# Patient Record
Sex: FEMALE | NOT HISPANIC OR LATINO | Employment: OTHER | URBAN - METROPOLITAN AREA
[De-identification: names, ages, dates, MRNs, and addresses within clinical notes are randomized per-mention and may not be internally consistent; named-entity substitution may affect disease eponyms.]

---

## 2020-01-17 RX ORDER — HYDROXYCHLOROQUINE SULFATE 200 MG/1
200 TABLET, FILM COATED ORAL 2 TIMES DAILY
COMMUNITY
End: 2024-09-05

## 2020-01-17 RX ORDER — BUPROPION HYDROCHLORIDE 300 MG/1
300 TABLET ORAL DAILY
COMMUNITY

## 2020-01-17 RX ORDER — ALBUTEROL SULFATE 90 UG/1
2 AEROSOL, METERED RESPIRATORY (INHALATION) EVERY 4 HOURS PRN
COMMUNITY

## 2020-01-17 RX ORDER — VITS A,C,E/LUTEIN/MINERALS 300MCG-200
1 TABLET ORAL AT BEDTIME
COMMUNITY
End: 2024-08-18

## 2020-01-17 RX ORDER — CINACALCET 30 MG/1
30 TABLET, FILM COATED ORAL DAILY
COMMUNITY

## 2020-01-17 RX ORDER — SOTALOL HYDROCHLORIDE 80 MG/1
80 TABLET ORAL 2 TIMES DAILY
COMMUNITY
End: 2024-08-18

## 2020-01-17 RX ORDER — ACETAMINOPHEN 500 MG
500-1000 TABLET ORAL 3 TIMES DAILY PRN
COMMUNITY
End: 2024-08-18

## 2020-01-17 RX ORDER — SIMVASTATIN 20 MG
20 TABLET ORAL DAILY
COMMUNITY
End: 2024-08-18

## 2020-01-17 RX ORDER — AMOXICILLIN 500 MG
1200 CAPSULE ORAL 2 TIMES DAILY
COMMUNITY
End: 2024-08-18

## 2020-01-17 RX ORDER — PREDNISONE 5 MG/1
5-25 TABLET ORAL DAILY PRN
COMMUNITY

## 2020-01-17 RX ORDER — LOSARTAN POTASSIUM 50 MG/1
50 TABLET ORAL DAILY
COMMUNITY

## 2020-01-20 ENCOUNTER — APPOINTMENT (OUTPATIENT)
Dept: GENERAL RADIOLOGY | Facility: CLINIC | Age: 75
End: 2020-01-20
Attending: ORTHOPAEDIC SURGERY
Payer: MEDICARE

## 2020-01-20 ENCOUNTER — ANESTHESIA (OUTPATIENT)
Dept: SURGERY | Facility: CLINIC | Age: 75
End: 2020-01-20
Payer: MEDICARE

## 2020-01-20 ENCOUNTER — ANESTHESIA EVENT (OUTPATIENT)
Dept: SURGERY | Facility: CLINIC | Age: 75
End: 2020-01-20
Payer: MEDICARE

## 2020-01-20 ENCOUNTER — HOSPITAL ENCOUNTER (OUTPATIENT)
Facility: CLINIC | Age: 75
Discharge: HOME | End: 2020-01-20
Attending: ORTHOPAEDIC SURGERY | Admitting: ORTHOPAEDIC SURGERY
Payer: MEDICARE

## 2020-01-20 VITALS
WEIGHT: 212.2 LBS | HEIGHT: 66 IN | RESPIRATION RATE: 16 BRPM | BODY MASS INDEX: 34.1 KG/M2 | SYSTOLIC BLOOD PRESSURE: 115 MMHG | HEART RATE: 58 BPM | TEMPERATURE: 97.3 F | DIASTOLIC BLOOD PRESSURE: 70 MMHG | OXYGEN SATURATION: 92 %

## 2020-01-20 DIAGNOSIS — Z98.890 S/P BUNIONECTOMY: Primary | ICD-10-CM

## 2020-01-20 PROCEDURE — 25000128 H RX IP 250 OP 636: Performed by: PHYSICIAN ASSISTANT

## 2020-01-20 PROCEDURE — 25000128 H RX IP 250 OP 636: Performed by: NURSE ANESTHETIST, CERTIFIED REGISTERED

## 2020-01-20 PROCEDURE — 71000012 ZZH RECOVERY PHASE 1 LEVEL 1 FIRST HR: Performed by: ORTHOPAEDIC SURGERY

## 2020-01-20 PROCEDURE — 40000277 XR SURGERY CARM FLUORO LESS THAN 5 MIN W STILLS

## 2020-01-20 PROCEDURE — 25000566 ZZH SEVOFLURANE, EA 15 MIN: Performed by: ORTHOPAEDIC SURGERY

## 2020-01-20 PROCEDURE — 93010 ELECTROCARDIOGRAM REPORT: CPT | Performed by: INTERNAL MEDICINE

## 2020-01-20 PROCEDURE — 37000009 ZZH ANESTHESIA TECHNICAL FEE, EACH ADDTL 15 MIN: Performed by: ORTHOPAEDIC SURGERY

## 2020-01-20 PROCEDURE — 25000125 ZZHC RX 250: Performed by: ORTHOPAEDIC SURGERY

## 2020-01-20 PROCEDURE — 37000008 ZZH ANESTHESIA TECHNICAL FEE, 1ST 30 MIN: Performed by: ORTHOPAEDIC SURGERY

## 2020-01-20 PROCEDURE — 25000128 H RX IP 250 OP 636: Performed by: ANESTHESIOLOGY

## 2020-01-20 PROCEDURE — 36000058 ZZH SURGERY LEVEL 3 EA 15 ADDTL MIN: Performed by: ORTHOPAEDIC SURGERY

## 2020-01-20 PROCEDURE — C1713 ANCHOR/SCREW BN/BN,TIS/BN: HCPCS | Performed by: ORTHOPAEDIC SURGERY

## 2020-01-20 PROCEDURE — 27210794 ZZH OR GENERAL SUPPLY STERILE: Performed by: ORTHOPAEDIC SURGERY

## 2020-01-20 PROCEDURE — 25000125 ZZHC RX 250: Performed by: NURSE ANESTHETIST, CERTIFIED REGISTERED

## 2020-01-20 PROCEDURE — 25800030 ZZH RX IP 258 OP 636: Performed by: NURSE ANESTHETIST, CERTIFIED REGISTERED

## 2020-01-20 PROCEDURE — 25000128 H RX IP 250 OP 636: Performed by: ORTHOPAEDIC SURGERY

## 2020-01-20 PROCEDURE — 40000065 ZZH STATISTIC EKG NON-CHARGEABLE

## 2020-01-20 PROCEDURE — 93005 ELECTROCARDIOGRAM TRACING: CPT

## 2020-01-20 PROCEDURE — 40000170 ZZH STATISTIC PRE-PROCEDURE ASSESSMENT II: Performed by: ORTHOPAEDIC SURGERY

## 2020-01-20 PROCEDURE — 25000132 ZZH RX MED GY IP 250 OP 250 PS 637: Mod: GY | Performed by: PHYSICIAN ASSISTANT

## 2020-01-20 PROCEDURE — 71000027 ZZH RECOVERY PHASE 2 EACH 15 MINS: Performed by: ORTHOPAEDIC SURGERY

## 2020-01-20 PROCEDURE — 36000060 ZZH SURGERY LEVEL 3 W FLUORO 1ST 30 MIN: Performed by: ORTHOPAEDIC SURGERY

## 2020-01-20 PROCEDURE — 71000013 ZZH RECOVERY PHASE 1 LEVEL 1 EA ADDTL HR: Performed by: ORTHOPAEDIC SURGERY

## 2020-01-20 PROCEDURE — 27110028 ZZH OR GENERAL SUPPLY NON-STERILE: Performed by: ORTHOPAEDIC SURGERY

## 2020-01-20 DEVICE — IMP SCR SYN CORTEX 1.5X14MM SELF TAP SS 200.814: Type: IMPLANTABLE DEVICE | Site: FOOT | Status: FUNCTIONAL

## 2020-01-20 DEVICE — IMP PIN ARTHREX BIO TRIM IT 2.0X100MM AR-4152DS: Type: IMPLANTABLE DEVICE | Site: FOOT | Status: FUNCTIONAL

## 2020-01-20 DEVICE — IMP SCR SYN CORTEX 2.0X16MM SELF TAP SS 201.816: Type: IMPLANTABLE DEVICE | Site: FOOT | Status: FUNCTIONAL

## 2020-01-20 RX ORDER — FENTANYL CITRATE 50 UG/ML
25-50 INJECTION, SOLUTION INTRAMUSCULAR; INTRAVENOUS
Status: DISCONTINUED | OUTPATIENT
Start: 2020-01-20 | End: 2020-01-20 | Stop reason: HOSPADM

## 2020-01-20 RX ORDER — HYDROCODONE BITARTRATE AND ACETAMINOPHEN 5; 325 MG/1; MG/1
1-2 TABLET ORAL EVERY 4 HOURS PRN
Qty: 40 TABLET | Refills: 0 | Status: SHIPPED | OUTPATIENT
Start: 2020-01-20 | End: 2024-08-18

## 2020-01-20 RX ORDER — HYDROMORPHONE HYDROCHLORIDE 1 MG/ML
.3-.5 INJECTION, SOLUTION INTRAMUSCULAR; INTRAVENOUS; SUBCUTANEOUS EVERY 10 MIN PRN
Status: DISCONTINUED | OUTPATIENT
Start: 2020-01-20 | End: 2020-01-20 | Stop reason: HOSPADM

## 2020-01-20 RX ORDER — ONDANSETRON 2 MG/ML
4 INJECTION INTRAMUSCULAR; INTRAVENOUS EVERY 30 MIN PRN
Status: DISCONTINUED | OUTPATIENT
Start: 2020-01-20 | End: 2020-01-20 | Stop reason: HOSPADM

## 2020-01-20 RX ORDER — LIDOCAINE HYDROCHLORIDE 20 MG/ML
INJECTION, SOLUTION INFILTRATION; PERINEURAL PRN
Status: DISCONTINUED | OUTPATIENT
Start: 2020-01-20 | End: 2020-01-20

## 2020-01-20 RX ORDER — ONDANSETRON 4 MG/1
4-8 TABLET, ORALLY DISINTEGRATING ORAL EVERY 6 HOURS PRN
Qty: 12 TABLET | Refills: 1 | Status: SHIPPED | OUTPATIENT
Start: 2020-01-20 | End: 2024-08-18

## 2020-01-20 RX ORDER — ONDANSETRON 2 MG/ML
INJECTION INTRAMUSCULAR; INTRAVENOUS PRN
Status: DISCONTINUED | OUTPATIENT
Start: 2020-01-20 | End: 2020-01-20

## 2020-01-20 RX ORDER — HYDROCODONE BITARTRATE AND ACETAMINOPHEN 5; 325 MG/1; MG/1
2 TABLET ORAL
Status: COMPLETED | OUTPATIENT
Start: 2020-01-20 | End: 2020-01-20

## 2020-01-20 RX ORDER — FENTANYL CITRATE 50 UG/ML
INJECTION, SOLUTION INTRAMUSCULAR; INTRAVENOUS PRN
Status: DISCONTINUED | OUTPATIENT
Start: 2020-01-20 | End: 2020-01-20

## 2020-01-20 RX ORDER — HYDROXYZINE HYDROCHLORIDE 10 MG/1
10 TABLET, FILM COATED ORAL
Status: DISCONTINUED | OUTPATIENT
Start: 2020-01-20 | End: 2020-01-20 | Stop reason: HOSPADM

## 2020-01-20 RX ORDER — CEFAZOLIN SODIUM 1 G/3ML
1 INJECTION, POWDER, FOR SOLUTION INTRAMUSCULAR; INTRAVENOUS SEE ADMIN INSTRUCTIONS
Status: DISCONTINUED | OUTPATIENT
Start: 2020-01-20 | End: 2020-01-20 | Stop reason: HOSPADM

## 2020-01-20 RX ORDER — ONDANSETRON 4 MG/1
4 TABLET, ORALLY DISINTEGRATING ORAL EVERY 30 MIN PRN
Status: DISCONTINUED | OUTPATIENT
Start: 2020-01-20 | End: 2020-01-20 | Stop reason: HOSPADM

## 2020-01-20 RX ORDER — DEXAMETHASONE SODIUM PHOSPHATE 4 MG/ML
INJECTION, SOLUTION INTRA-ARTICULAR; INTRALESIONAL; INTRAMUSCULAR; INTRAVENOUS; SOFT TISSUE PRN
Status: DISCONTINUED | OUTPATIENT
Start: 2020-01-20 | End: 2020-01-20

## 2020-01-20 RX ORDER — ROPIVACAINE HYDROCHLORIDE 5 MG/ML
INJECTION, SOLUTION EPIDURAL; INFILTRATION; PERINEURAL
Status: DISCONTINUED
Start: 2020-01-20 | End: 2020-01-20 | Stop reason: HOSPADM

## 2020-01-20 RX ORDER — MAGNESIUM HYDROXIDE 1200 MG/15ML
LIQUID ORAL PRN
Status: DISCONTINUED | OUTPATIENT
Start: 2020-01-20 | End: 2020-01-20 | Stop reason: HOSPADM

## 2020-01-20 RX ORDER — AMOXICILLIN 250 MG
1-2 CAPSULE ORAL 2 TIMES DAILY
Qty: 30 TABLET | Refills: 0 | Status: SHIPPED | OUTPATIENT
Start: 2020-01-20 | End: 2024-08-18

## 2020-01-20 RX ORDER — PROPOFOL 10 MG/ML
INJECTION, EMULSION INTRAVENOUS PRN
Status: DISCONTINUED | OUTPATIENT
Start: 2020-01-20 | End: 2020-01-20

## 2020-01-20 RX ORDER — NALOXONE HYDROCHLORIDE 0.4 MG/ML
.1-.4 INJECTION, SOLUTION INTRAMUSCULAR; INTRAVENOUS; SUBCUTANEOUS
Status: DISCONTINUED | OUTPATIENT
Start: 2020-01-20 | End: 2020-01-20 | Stop reason: HOSPADM

## 2020-01-20 RX ORDER — ROPIVACAINE HYDROCHLORIDE 5 MG/ML
INJECTION, SOLUTION EPIDURAL; INFILTRATION; PERINEURAL PRN
Status: DISCONTINUED | OUTPATIENT
Start: 2020-01-20 | End: 2020-01-20 | Stop reason: HOSPADM

## 2020-01-20 RX ORDER — SODIUM CHLORIDE, SODIUM LACTATE, POTASSIUM CHLORIDE, CALCIUM CHLORIDE 600; 310; 30; 20 MG/100ML; MG/100ML; MG/100ML; MG/100ML
INJECTION, SOLUTION INTRAVENOUS CONTINUOUS
Status: DISCONTINUED | OUTPATIENT
Start: 2020-01-20 | End: 2020-01-20 | Stop reason: HOSPADM

## 2020-01-20 RX ORDER — SODIUM CHLORIDE, SODIUM LACTATE, POTASSIUM CHLORIDE, CALCIUM CHLORIDE 600; 310; 30; 20 MG/100ML; MG/100ML; MG/100ML; MG/100ML
INJECTION, SOLUTION INTRAVENOUS CONTINUOUS PRN
Status: DISCONTINUED | OUTPATIENT
Start: 2020-01-20 | End: 2020-01-20

## 2020-01-20 RX ORDER — ONDANSETRON 4 MG/1
4 TABLET, ORALLY DISINTEGRATING ORAL
Status: DISCONTINUED | OUTPATIENT
Start: 2020-01-20 | End: 2020-01-20 | Stop reason: HOSPADM

## 2020-01-20 RX ORDER — CEFAZOLIN SODIUM 2 G/100ML
2 INJECTION, SOLUTION INTRAVENOUS
Status: COMPLETED | OUTPATIENT
Start: 2020-01-20 | End: 2020-01-20

## 2020-01-20 RX ADMIN — FENTANYL CITRATE 50 MCG: 0.05 INJECTION, SOLUTION INTRAMUSCULAR; INTRAVENOUS at 10:06

## 2020-01-20 RX ADMIN — PROPOFOL 50 MG: 10 INJECTION, EMULSION INTRAVENOUS at 09:10

## 2020-01-20 RX ADMIN — HYDROCODONE BITARTRATE AND ACETAMINOPHEN 1 TABLET: 5; 325 TABLET ORAL at 10:47

## 2020-01-20 RX ADMIN — HYDROMORPHONE HYDROCHLORIDE 0.5 MG: 1 INJECTION, SOLUTION INTRAMUSCULAR; INTRAVENOUS; SUBCUTANEOUS at 09:15

## 2020-01-20 RX ADMIN — DEXAMETHASONE SODIUM PHOSPHATE 4 MG: 4 INJECTION, SOLUTION INTRA-ARTICULAR; INTRALESIONAL; INTRAMUSCULAR; INTRAVENOUS; SOFT TISSUE at 09:15

## 2020-01-20 RX ADMIN — ONDANSETRON 4 MG: 2 INJECTION INTRAMUSCULAR; INTRAVENOUS at 09:15

## 2020-01-20 RX ADMIN — FENTANYL CITRATE 100 MCG: 50 INJECTION, SOLUTION INTRAMUSCULAR; INTRAVENOUS at 09:05

## 2020-01-20 RX ADMIN — FENTANYL CITRATE 50 MCG: 0.05 INJECTION, SOLUTION INTRAMUSCULAR; INTRAVENOUS at 10:35

## 2020-01-20 RX ADMIN — LIDOCAINE HYDROCHLORIDE 60 MG: 20 INJECTION, SOLUTION INFILTRATION; PERINEURAL at 09:05

## 2020-01-20 RX ADMIN — PROPOFOL 150 MG: 10 INJECTION, EMULSION INTRAVENOUS at 09:05

## 2020-01-20 RX ADMIN — CEFAZOLIN SODIUM 2 G: 2 INJECTION, SOLUTION INTRAVENOUS at 09:07

## 2020-01-20 RX ADMIN — SODIUM CHLORIDE, POTASSIUM CHLORIDE, SODIUM LACTATE AND CALCIUM CHLORIDE: 600; 310; 30; 20 INJECTION, SOLUTION INTRAVENOUS at 09:06

## 2020-01-20 ASSESSMENT — COPD QUESTIONNAIRES
CAT_SEVERITY: MODERATE
COPD: 1

## 2020-01-20 ASSESSMENT — MIFFLIN-ST. JEOR: SCORE: 1469.28

## 2020-01-20 ASSESSMENT — LIFESTYLE VARIABLES: TOBACCO_USE: 1

## 2020-01-20 ASSESSMENT — ENCOUNTER SYMPTOMS: DYSRHYTHMIAS: 1

## 2020-01-20 NOTE — OP NOTE
Procedure Date: 01/20/2020      PREOPERATIVE DIAGNOSES:   1.  Severe right-sided bunion deformity.   2.  Fixed clawtoe deformities of the second, third and fourth toes.      POSTOPERATIVE DIAGNOSES:   1.  Severe right-sided bunion deformity.   2.  Fixed clawtoe deformities of the second, third and fourth toes.      PROCEDURES:   1.  BOAT metatarsal osteotomy and bunion repair, right foot, as well as an Akin osteotomy of the proximal phalanx.   2.  Second, third and fourth claw toe repairs with proximal interphalangeal joint excision arthroplasties and intramedullary fixation.   3.  Open Z lengthenings and metatarsophalangeal joint capsulotomies of the second and third toes to correct dorsiflexion and medial deviation contractures.      ANESTHESIA:  General.      SURGEON:  Neris Napoles MD      ASSISTANT:  NAIDA Fisher      PREAMBLE:  Ms. Barriga presented with a large bunion deformity on the right.  She also has fixed clawtoe deformities of the second, third and fourth toes.      Informed consent was obtained for the above-mentioned procedures.      Two grams of Ancef was given prior to surgery.  There is no need for postoperative antibiotics.      DESCRIPTION OF PROCEDURE:  After adequate induction of a general anesthetic, the patient was positioned supine on the operating table.  The right leg was sterilely prepped and free-draped in the usual fashion.  Tourniquet around the thigh was inflated to 300 mmHg.      A 6 cm longitudinal incision was made medial over the right great toe MTP joint.  The capsule was incised longitudinally.  The medial eminence was removed with a saw.  This was followed by a 90-degree metatarsal osteotomy with a long plantar limb.  The capital fragment was translated laterally by 6 mm and immobilized with 2 small screws.      The sesamoids were reduced and the medial capsule repaired.      This was followed by a 20-degree medially-based closing wedge osteotomy of the proximal  phalanx of the great toe.  The osteotomy was secured with a suture.  This gave satisfactory alignment of the first ray.      Attention was moved to the second toe.  A longitudinal incision was made over the second toe PIP joint.  PIP joint excision arthroplasty was done to correct the fixed deformity.  The toe was immobilized with an intramedullary Trim-It pin.      There was still an extension contracture at the MTP joint, and a separate incision was made over the MTP joint to do Z lengthening of the extensor tendon, as well as a dorsal and medial MTP joint capsulotomy.      Attention was moved to the third toe.  A transverse incision was made over the PIP joint, and a PIP joint excision arthroplasty was done.  The toe was then immobilized with an intramedullary pin.      A similar dorsal incision was made over the MTP joint to do an extensor tendon lengthening, as well as a dorsal and medial capsulotomy of the MTP joint.      Attention was moved to the fourth toe.  The deformity was mainly in the DIP joint, and a DIP joint excision arthroplasty was done followed by a Trim-It fixation.      This gave satisfactory alignment of her foot.  It was not necessary to do anything to the little toe.      The tourniquet was deflated.  Hemostasis obtained.  The wounds closed in layers.  A sterile dressing and a light compressive bandage applied.  She tolerated the operative procedure well and was taken to the recovery room in satisfactory condition after application of a bunion shoe.      She can ambulate weightbearing as tolerated.  Sutures will be removed in 2 weeks.         BERTO AMAYA MD             D: 2020   T: 2020   MT: MASON      Name:     CHUCK OREILLY   MRN:      [id number]        Account:        [id number]   :      1945           Procedure Date: 2020      Document: [id number]

## 2020-01-20 NOTE — ANESTHESIA PREPROCEDURE EVALUATION
Anesthesia Pre-Procedure Evaluation    Patient: Heydi Barriga   MRN: [id number] : 1945          Preoperative Diagnosis: Bunion [M21.619]  Clawtoe, acquired, left [M20.5X2]    Procedure(s):  LEFT SECOND AND THIRD HAMMERTOE CORRECTIONS, RIGHT BUION AND SECOND CLAW TOE  CORRECTIONS ((MINI FRAG AND ARTHREX TRIM IT PINS)    Past Medical History:   Diagnosis Date     Acute appendicitis      Bradycardia      Chest pain      Claustrophobia      COPD (chronic obstructive pulmonary disease) (H)      Coronary artery disease     coronary Atherosclerosis     Depression      Depressive disorder      Disorder of sacrum      JASON (generalized anxiety disorder)      Gastro-oesophageal reflux disease      Hyperlipidemia      Hyperparathyroidism (H)      Hypertension      Myofascial pain      Obesity      Osteoarthritis     Knees and feet     PAF (paroxysmal atrial fibrillation) (H)      Polyarthralgia      Rheumatoid arthritis (H)      Right knee skin infection     2012     Rotator cuff dysfunction, left      Sleep apnea     has CPAP, but does not use, Claustrophobic     Synovial cyst popliteal space     Right     Ulnar neuropathy      Urinary incontinence      Vitamin D deficiency      Past Surgical History:   Procedure Laterality Date     ARTHROPLASTY KNEE      Right     ARTHROPLASTY MINIMALLY INVASIVE KNEE  2012    Procedure: ARTHROPLASTY MINIMALLY INVASIVE KNEE;  Left Total Knee Arthroplasty Minimally Invasive;  Surgeon: Daron Ro MD;  Location: [facility] OR     BLEPHAROPLASTY, RHYTIDECTOMY, COMBINED       BREAST BIOPSY, CORE RT/LT      benign     C EVACUATION OF HEMATOMA      right and left neck     COLONOSCOPY       DILATION AND CURETTAGE       INCISION AND DRAINAGE KNEE, COMBINED  12/10/2012    Procedure: COMBINED INCISION AND DRAINAGE KNEE;;  Surgeon: Daron Ro MD;  Location: [facility]     IRRIGATION AND DEBRIDEMENT KNEE, COMBINED  2012    Procedure: COMBINED IRRIGATION AND DEBRIDEMENT KNEE;   Right Knee Irrigation & Debridement.;  Surgeon: Daron Ro MD;  Location: [facility] OR       Anesthesia Evaluation     . Pt has had prior anesthetic.     No history of anesthetic complications          ROS/MED HX    ENT/Pulmonary: Comment: DEMETRI in past, no CPAP, believes this has resolved following weight loss    (+)tobacco use, Past use moderate COPD, , . .   (-) recent URI   Neurologic:  - neg neurologic ROS     Cardiovascular:     (+) hypertension--CAD, --. Taking blood thinners : . . . :. dysrhythmias a-fib, .      (-) angina, past MI, CHF, pacemaker, valvular problems/murmurs, stent, pacemaker, angina, past MI and CABG   METS/Exercise Tolerance:  >4 METS   Hematologic:  - neg hematologic  ROS       Musculoskeletal: Comment: RA  OA s/p TKA  Low back pain        GI/Hepatic:     (+) GERD Asymptomatic on medication,       Renal/Genitourinary:      (-) renal disease   Endo:      (-) Type II DM   Psychiatric:  - neg psychiatric ROS       Infectious Disease:         Malignancy:         Other:                          Physical Exam  Normal systems: dental    Airway   Mallampati: II  TM distance: >3 FB  Neck ROM: full    Dental     Cardiovascular   Rhythm and rate: regular and normal      Pulmonary    breath sounds clear to auscultation            Lab Results   Component Value Date    WBC 7.3 12/06/2012    HGB 8.9 (L) 12/12/2012    HCT 26.3 (L) 12/06/2012     12/10/2012    .1 (H) 12/06/2012     (H) 12/06/2012     12/12/2012    POTASSIUM 4.0 12/12/2012    CHLORIDE 99 12/12/2012    CO2 27 12/12/2012    BUN 10 12/12/2012    CR 0.60 12/12/2012     (H) 12/12/2012    TIGIST 9.1 12/12/2012    MAG 2.0 07/17/2012    ALBUMIN 3.9 07/10/2012    PROTTOTAL 7.0 07/10/2012    ALT 13 08/17/2012    AST 26 08/17/2012    ALKPHOS 87 08/17/2012    BILITOTAL 0.3 08/17/2012    PTT 43 (H) 12/10/2012    INR 1.16 (H) 12/10/2012       Preop Vitals  BP Readings from Last 3 Encounters:   01/20/20 130/63   12/14/12  "127/50   12/12/12 121/67    Pulse Readings from Last 3 Encounters:   01/20/20 70   12/14/12 78   12/12/12 65      Resp Readings from Last 3 Encounters:   01/20/20 16   12/14/12 18   12/12/12 16    SpO2 Readings from Last 3 Encounters:   01/20/20 97%   12/14/12 97%   12/12/12 96%      Temp Readings from Last 1 Encounters:   01/20/20 36.3  C (97.3  F) (Oral)    Ht Readings from Last 1 Encounters:   01/20/20 1.676 m (5' 6\")      Wt Readings from Last 1 Encounters:   01/20/20 96.3 kg (212 lb 3.2 oz)    Estimated body mass index is 34.25 kg/m  as calculated from the following:    Height as of this encounter: 1.676 m (5' 6\").    Weight as of this encounter: 96.3 kg (212 lb 3.2 oz).       Anesthesia Plan      History & Physical Review  History and physical reviewed and following examination; no interval change.    ASA Status:  3 .    NPO Status:  > 8 hours    Plan for General and LMA with Intravenous and Propofol induction. Maintenance will be Balanced.    PONV prophylaxis:  Ondansetron (or other 5HT-3) and Dexamethasone or Solumedrol       Postoperative Care  Postoperative pain management:  IV analgesics and Oral pain medications.      Consents  Anesthetic plan, risks, benefits and alternatives discussed with:  Patient..                 Neymar Yanes MD  "

## 2020-01-20 NOTE — BRIEF OP NOTE
[facility]    Brief Operative Note    Pre-operative diagnosis: Bunion [M21.619]  Clawtoe, acquired, left [M20.5X2]  Post-operative diagnosis bunion hammertoes    Procedure: Procedure(s):  RIGHT BUNION AND CLAW TOE REPAIR TWO THROUGH FIVE  Surgeon: Surgeon(s) and Role:     * Neris Napoles MD - Primary     * Truman Velez PA-C - Assisting  Anesthesia: General   Estimated blood loss: Less than 50 ml  Drains: None  Specimens: * No specimens in log *  Findings:   expected.  Complications: None.  Implants:   Implant Name Type Inv. Item Serial No.  Lot No. LRB No. Used   IMP PIN ARTHREX BIO TRIM IT [id number] AR-[id number] Metallic Hardware/[employer] IMP PIN ARTHREX BIO TRIM IT [id number] AR-[id number]  ARTHREX [id number] Right 2   IMP SCR SYN CORTEX 2.0X16MM SELF TAP .816 Metallic Hardware/[employer]ebring IMP SCR SYN CORTEX 2.0X16MM SELF TAP .816  SYNTHES-STRATEC 364   [id number]  [id number] 2019 Right 1   IMP SCR SYN CORTEX 1.5X14MM SELF TAP .814 Metallic Hardware/[employer]ebring IMP SCR SYN CORTEX 1.5X14MM SELF TAP .814  SYNTHES-STRATEC 364   [id number]  310DEC 2019 Right 1

## 2020-01-20 NOTE — ANESTHESIA POSTPROCEDURE EVALUATION
Patient: Heydi Barriga    Procedure(s):  RIGHT BUNION AND CLAW TOE REPAIR TWO THROUGH FIVE    Diagnosis:Bunion [M21.619]  Clawtoe, acquired, left [M20.5X2]  Diagnosis Additional Information: No value filed.    Anesthesia Type:  General, LMA    Note:  Anesthesia Post Evaluation    Patient location during evaluation: PACU  Patient participation: Able to fully participate in evaluation  Level of consciousness: awake  Pain management: adequate  Airway patency: patent  Cardiovascular status: acceptable  Respiratory status: acceptable  Hydration status: acceptable  PONV: none     Anesthetic complications: None          Last vitals:  Vitals:    01/20/20 1045 01/20/20 1100 01/20/20 1222   BP: (!) 150/72 (!) 148/78 115/70   Pulse: 76 72 58   Resp: 16 16 16   Temp:      SpO2: 96% 95% 92%         Electronically Signed By: Neymar Yanes MD  January 20, 2020  1:19 PM

## 2020-01-20 NOTE — ANESTHESIA CARE TRANSFER NOTE
Patient: Heydi Barriga    Procedure(s):  RIGHT BUNION AND CLAW TOE REPAIR TWO THROUGH FIVE    Diagnosis: Bunion [M21.619]  Clawtoe, acquired, left [M20.5X2]  Diagnosis Additional Information: No value filed.    Anesthesia Type:   General, LMA     Note:  Airway :Face Mask  Patient transferred to:PACU  Comments: A&O x 3.  Denies pain, N&V.  Report to RN.      Vitals: (Last set prior to Anesthesia Care Transfer)    CRNA VITALS  1/20/2020 0928 - 1/20/2020 1003      1/20/2020             Pulse:  70    SpO2:  97 %    Resp Rate (set):  10                Electronically Signed By: MYKE Weeks CRNA  January 20, 2020  10:03 AM

## 2020-01-20 NOTE — PROGRESS NOTES
Medication History Completed by Medication Scribe  Admission medication history interview status for the 1/20/2020  admission is complete. See EPIC admission navigator for prior to admission medications     Medication history sources: Patient, Surescripts, H&P and Patient's home med list  Medication history source reliability: Good  Adherence assessment: Good    Significant changes made to the medication list:  Patient taking meds differently than prescribed; See PTA entries for: prednisone      Additional medication history information:   None    Medication reconciliation completed by provider prior to medication history? No    Time spent in this activity: 30 minutes      Prior to Admission medications    Medication Sig Last Dose Taking? Auth Provider   acetaminophen (TYLENOL) 500 MG tablet Take 500-1,000 mg by mouth 3 times daily as needed for mild pain 500 mg on 1/20/2020 at 0430 Yes Reported, Patient   aclidinium (TUDORZA PRESSAIR) 400 MCG/ACT inhaler Inhale 1-2 puffs into the lungs 2 times daily 1 puff on 1/20/2020 at 0430 Yes Reported, Patient   albuterol (PROAIR HFA/PROVENTIL HFA/VENTOLIN HFA) 108 (90 Base) MCG/ACT inhaler Inhale 1-2 puffs into the lungs every 4 hours as needed for shortness of breath / dyspnea or wheezing 1/15/2020 at prn Yes Reported, Patient   Ascorbic Acid 1000 MG TABS Take 1,000 mg by mouth 2 times daily  1/19/2020 at 1200 Yes Reported, Patient   B Complex Vitamins (VITAMIN-B COMPLEX PO) Take 1 tablet by mouth daily 1/19/2020 at 1200 Yes Reported, Patient   buPROPion (WELLBUTRIN XL) 300 MG 24 hr tablet Take 300 mg by mouth daily (at 12:00) 1/19/2020 at 1200 Yes Reported, Patient   Cholecalciferol (VITAMIN D) 2000 UNITS tablet Take 4,000 Units by mouth At Bedtime  1/19/2020 at 2330 Yes Reported, Patient   cinacalcet (SENSIPAR) 30 MG tablet Take 30 mg by mouth daily (at 12:00) 1/19/2020 at 1200 Yes Reported, Patient   escitalopram (LEXAPRO) 20 MG tablet Take 40 mg by mouth daily (Take 2 X  20 mg = 40 mg dose) 1/19/2020 at 1200 Yes Reported, Patient   hydroxychloroquine (PLAQUENIL) 200 MG tablet Take 200 mg by mouth 2 times daily 1/19/2020 at 2330 Yes Reported, Patient   losartan (COZAAR) 50 MG tablet Take 50 mg by mouth daily (at 12:00) 1/19/2020 at 1200 Yes Reported, Patient   Multiple Vitamins-Minerals (MULTIVITAMIN OR) Take 1 tablet by mouth daily (at 12:00) 1/19/2020 at 1200 Yes Reported, Patient   multivitamin (OCUVITE) TABS tablet Take 1 tablet by mouth At Bedtime  1/19/2020 at 2330 Yes Reported, Patient   Omega-3 Fatty Acids (FISH OIL) 1200 MG capsule Take 1,200 mg by mouth 2 times daily Over 2 months ago at am Yes Reported, Patient   omeprazole (PRILOSEC) 20 MG DR capsule Take 20 mg by mouth 2 times daily  1/19/2020 at 2330 Yes Reported, Patient   PRASTERONE, DHEA, PO Take 5 mg by mouth daily  1/19/2020 at 1200 Yes Reported, Patient   predniSONE (DELTASONE) 5 MG tablet Take 5-25 mg by mouth daily as needed (arthritis)  25 mg on 1/19/2020 at 1200 Yes Reported, Patient   Probiotic Product (PROBIOTIC-10 PO) Take 1 capsule by mouth daily (at 12:00) 1/19/2020 at 1200 Yes Reported, Patient   pyridOXINE (VITAMIN B6) 100 MG TABS Take 100 mg by mouth daily (at 12:00) 1/19/2020 at 1200 Yes Reported, Patient   rivaroxaban ANTICOAGULANT (XARELTO) 20 MG TABS tablet Take 20 mg by mouth daily (with dinner) 1/16/2020 at pm Yes Reported, Patient   simvastatin (ZOCOR) 20 MG tablet Take 20 mg by mouth daily (at 12:00) 1/19/2020 at 1200 Yes Reported, Patient   sotalol (BETAPACE) 80 MG tablet Take 80 mg by mouth 2 times daily 1/19/2020 at 2330 Yes Reported, Patient   tofacitinib (XELJANZ) 5 MG tablet Take 5 mg by mouth 2 times daily 1/19/2020 at 2330 Yes Reported, Patient   tretinoin (RETIN-A) 0.05 % cream Apply  topically nightly as needed. Over 2 months ago at prn Yes Reported, Patient

## 2020-01-20 NOTE — DISCHARGE INSTRUCTIONS
**If you have questions or concerns about your procedure,   call Dr. Napoles at [phone]**        Same Day Surgery Discharge Instructions for  Sedation and General Anesthesia       It's not unusual to feel dizzy, light-headed or faint for up to 24 hours after surgery or while taking pain medication.  If you have these symptoms: sit for a few minutes before standing and have someone assist you when you get up to walk or use the bathroom.      You should rest and relax for the next 24 hours. We recommend you make arrangements to have an adult stay with you for at least 24 hours after your discharge.  Avoid hazardous and strenuous activity.      DO NOT DRIVE any vehicle or operate mechanical equipment for 24 hours following the end of your surgery.  Even though you may feel normal, your reactions may be affected by the medication you have received.      Do not drink alcoholic beverages for 24 hours following surgery.       Slowly progress to your regular diet as you feel able. It's not unusual to feel nauseated and/or vomit after receiving anesthesia.  If you develop these symptoms, drink clear liquids (apple juice, ginger ale, broth, 7-up, etc. ) until you feel better.  If your nausea and vomiting persists for 24 hours, please notify your surgeon.        All narcotic pain medications, along with inactivity and anesthesia, can cause constipation. Drinking plenty of liquids and increasing fiber intake will help.      For any questions of a medical nature, call your surgeon.      Do not make important decisions for 24 hours.      If you had general anesthesia, you may have a sore throat for a couple of days related to the breathing tube used during surgery.  You may use Cepacol lozenges to help with this discomfort.  If it worsens or if you develop a fever, contact your surgeon.       If you feel your pain is not well managed with the pain medications prescribed by your surgeon, please contact your surgeon's office  to let them know so they can address your concerns.   POST-OPERATIVE INSTRUCTIONS  FOOT AND ANKLE SURGERY  RAYNA Napoles M.D.  Toribio Velez P.A.-C    These precautions MUST be followed for the first 24 hours after surgery:    Upon discharge, go directly home.    You must make arrangements to have a responsible adult stay with you.    DO NOT DRINK ALCOHOLIC BEVERAGES    It is not unusual to feel lightheaded up to 24 hours after surgery or while taking pain medication.  If you feel lightheaded, sit for a few minutes before standing and have someone assisted you when you get up to walk or use the restroom.    Do not use any mechanical equipment.    Do not make any important or legal decisions for 24 hours or while on pain medications.    You may experience dry mouth, sore throat, or sleep disturbances from the anesthesia and medications used during surgery.  Generalized muscle aches can sometimes occur.  These usually disappear in 12-24 hours.    POST-OPERATIVE CARE GUIDELINES    The following are general guidelines about what to expect the first days/weeks after surgery.  They are not specific, and your recovery may be slightly different.    Blood clots are not common, but are emergencies.  If you have sudden onset pain in your calf or leg, or have sudden shortness of breath, go to the Emergency Department.      Elevation of your operative foot/ankle is key to reducing the swelling in the immediate post-operative phase (first 3-5 days).  When you are at rest, elevate your foot at or above the level of your heart.  When sitting, your foot should be elevated on a chair or stool; not hanging down.      You should plan to rest the day after surgery no matter how minor the procedure.  More complicated procedures will require more time to return to normal activity.      Foot and ankle surgery is painful in most cases.   It is not unusual for the pain to be worse a day or two after surgery than on the day of.  If your  pain is more than 8/10 contact our office.  If you don t have pain, gradually decrease your pain meds by substituting Tylenol.  Please don t use Advil/ibuprofen unless we order it for you.  Pt may resume regular home medications of:  ALL PRESCRIBED BLOOD THINNERS (INCLUDING ASA 81MG) on the day after surgery.        You will be prescribed Percocet or Vicodin for pain, Vistaril for spasms/pain adjunct, Senokot for constipation.  If you have had trouble taking these meds before or experience nausea or vomiting after surgery from your medication, please advise the recovery room nurses.  If you are already at home, try drinking only clear liquids and/or call our office.  Start taking narcotic pain medication when you feel sensation in your lower extremity after a block.       All pain medications, along with inactivity can cause constipation.  Use the Senakot as directed, increase fluid intake to 1 quart per day and increase your dietary fiber.  (The  P  fruits - peaches, plums, pears, and prunes as well as anise/black licorice are recommended.)    It is not unusual to run a low-grade fever after surgery.  If your temperature is elevated above 102 , lasts longer than 24 hours, or is questionable in any way, contact our office.      Drainage from your cast/dressing is normal.  Reinforce your cast/dressing with 4x4 dressings and cover with an Ace wrap.  Wait until 24 hours after surgery to change your dressings; by this time most of your bleeding will have stopped.  If you have drainage through your dressings 2 days after surgery, contact our office.      You cast/dressing will be changed at your 2-week follow up appointment.  They should be kept clean and DRY.  If your cast/dressing is damaged before that, contact our office.      It is normal to experience some bruising in the toes after surgery and they may appear  dark  when your foot hangs down.  It is important to actively wiggle your toes for at least 5-10 minutes  each hour UNLESS you had surgery on those toes.      Use ice on your foot/ankle over the dressing/cast for 20 minutes per hour, 10 or more times per day.  A large bag of frozen peas works well.      Bathing: take a tub or sponge bath instead of a shower if possible during the first two weeks.  If you choose to shower, cover the dressing/cast with a waterproof covering, these may be ordered from www.seal-tight.com or are available at some pharmacies/medical supply stores.  Another option is XeroSox, which is the original vacuum sealed bandage and cast cover.  The cast cover has a vacuum seal and is absolutely waterproof.  2-564-838-2985 or [email or web address].[email or web address] for more information.      Driving:  For surgery on the left foot/ankle, you may drive as soon as narcotic medications are stopped.  For surgery on the right foot/ankle, you may drive when you are out of a cast, off pain medications, and you feel secure with braking.      In general, listen to your foot/ankle.  If you have a sudden, dramatic increase in pain that does not resolve after an hour or so, something is wrong - call our office.  If you fall or bump your foot/ankle and have sudden pain that resolves, give it 24 hours before you call.      Many of your questions can be addressed at your 2-week follow-up appointment - please make a list of things to ask us as they come up during your recovery.      If you had a nerve block it is common to have numbness in your leg and foot for up to 30 hours after surgery.  If your leg or foot is still numb more than 30 hours after surgery, please call the office.      FUTURE DENTIST VISITS:  If you have had a total ankle arthroplasty please be advised you must be on antibiotics prior to ANY dental work.  Please call your dentist office ahead of time and make them aware of this as your dentist will be able to order the prescription.  If you have had any other surgery this is not a concern.    If you have any further questions  or concerns, please call our office at [phone]      Pt may resume regular home medications of all blood thinners including aspirin on the day after surgery.

## 2020-01-24 LAB — INTERPRETATION ECG - MUSE: NORMAL

## 2021-05-26 ENCOUNTER — RECORDS - HEALTHEAST (OUTPATIENT)
Dept: ADMINISTRATIVE | Facility: CLINIC | Age: 76
End: 2021-05-26

## 2021-05-28 ENCOUNTER — RECORDS - HEALTHEAST (OUTPATIENT)
Dept: ADMINISTRATIVE | Facility: CLINIC | Age: 76
End: 2021-05-28

## 2021-05-29 ENCOUNTER — RECORDS - HEALTHEAST (OUTPATIENT)
Dept: ADMINISTRATIVE | Facility: CLINIC | Age: 76
End: 2021-05-29

## 2021-05-30 ENCOUNTER — RECORDS - HEALTHEAST (OUTPATIENT)
Dept: ADMINISTRATIVE | Facility: CLINIC | Age: 76
End: 2021-05-30

## 2022-09-16 ENCOUNTER — TRANSCRIBE ORDERS (OUTPATIENT)
Dept: OTHER | Age: 77
End: 2022-09-16

## 2022-09-16 DIAGNOSIS — K21.9 GASTROESOPHAGEAL REFLUX DISEASE, UNSPECIFIED WHETHER ESOPHAGITIS PRESENT: Primary | ICD-10-CM

## 2024-04-24 ENCOUNTER — DOCUMENTATION ONLY (OUTPATIENT)
Dept: OTHER | Facility: CLINIC | Age: 79
End: 2024-04-24
Payer: MEDICARE

## 2024-06-19 ENCOUNTER — ANCILLARY PROCEDURE (OUTPATIENT)
Dept: PET IMAGING | Facility: CLINIC | Age: 79
End: 2024-06-19
Attending: PSYCHIATRY & NEUROLOGY
Payer: MEDICARE

## 2024-06-19 DIAGNOSIS — G30.9 ALZHEIMERS DISEASE (H): ICD-10-CM

## 2024-06-19 DIAGNOSIS — F02.80 ALZHEIMERS DISEASE (H): ICD-10-CM

## 2024-07-02 ENCOUNTER — DOCUMENTATION ONLY (OUTPATIENT)
Dept: OTHER | Facility: CLINIC | Age: 79
End: 2024-07-02
Payer: MEDICARE

## 2024-07-02 ENCOUNTER — DOCUMENTATION ONLY (OUTPATIENT)
Dept: GERIATRICS | Facility: CLINIC | Age: 79
End: 2024-07-02
Payer: MEDICARE

## 2024-07-08 ENCOUNTER — DOCUMENTATION ONLY (OUTPATIENT)
Dept: GERIATRICS | Facility: CLINIC | Age: 79
End: 2024-07-08
Payer: MEDICARE

## 2024-07-08 DIAGNOSIS — Z53.9 ERRONEOUS ENCOUNTER--DISREGARD: Primary | ICD-10-CM

## 2024-07-09 ENCOUNTER — ASSISTED LIVING VISIT (OUTPATIENT)
Dept: GERIATRICS | Facility: CLINIC | Age: 79
End: 2024-07-09
Payer: MEDICARE

## 2024-07-09 DIAGNOSIS — Z71.89 ADVANCED DIRECTIVES, COUNSELING/DISCUSSION: ICD-10-CM

## 2024-07-09 DIAGNOSIS — F03.A0 MILD DEMENTIA WITHOUT BEHAVIORAL DISTURBANCE, PSYCHOTIC DISTURBANCE, MOOD DISTURBANCE, OR ANXIETY, UNSPECIFIED DEMENTIA TYPE (H): Primary | ICD-10-CM

## 2024-07-09 DIAGNOSIS — M81.0 AGE-RELATED OSTEOPOROSIS WITHOUT CURRENT PATHOLOGICAL FRACTURE: ICD-10-CM

## 2024-07-09 DIAGNOSIS — F33.0 MAJOR DEPRESSIVE DISORDER, RECURRENT EPISODE, MILD (H): ICD-10-CM

## 2024-07-09 DIAGNOSIS — E21.0 PRIMARY HYPERPARATHYROIDISM (H): ICD-10-CM

## 2024-07-09 DIAGNOSIS — K21.9 GASTROESOPHAGEAL REFLUX DISEASE, UNSPECIFIED WHETHER ESOPHAGITIS PRESENT: ICD-10-CM

## 2024-07-09 DIAGNOSIS — I10 PRIMARY HYPERTENSION: ICD-10-CM

## 2024-07-09 DIAGNOSIS — I48.20 CHRONIC A-FIB (H): ICD-10-CM

## 2024-07-09 DIAGNOSIS — I25.10 CORONARY ARTERY DISEASE INVOLVING NATIVE CORONARY ARTERY OF NATIVE HEART WITHOUT ANGINA PECTORIS: ICD-10-CM

## 2024-07-09 DIAGNOSIS — J44.9 CHRONIC OBSTRUCTIVE PULMONARY DISEASE WITHOUT EXACERBATION (H): ICD-10-CM

## 2024-07-09 DIAGNOSIS — M06.9 RHEUMATOID ARTHRITIS INVOLVING MULTIPLE SITES, UNSPECIFIED WHETHER RHEUMATOID FACTOR PRESENT (H): ICD-10-CM

## 2024-07-09 DIAGNOSIS — R32 URINARY INCONTINENCE, UNSPECIFIED TYPE: ICD-10-CM

## 2024-07-09 PROCEDURE — 99345 HOME/RES VST NEW HIGH MDM 75: CPT | Performed by: NURSE PRACTITIONER

## 2024-07-09 RX ORDER — VIBEGRON 75 MG/1
75 TABLET, FILM COATED ORAL DAILY
COMMUNITY

## 2024-07-09 RX ORDER — ATOMOXETINE 40 MG/1
40 CAPSULE ORAL DAILY
COMMUNITY

## 2024-07-09 RX ORDER — ROSUVASTATIN CALCIUM 10 MG/1
10 TABLET, COATED ORAL DAILY
COMMUNITY

## 2024-07-09 RX ORDER — BUDESONIDE AND FORMOTEROL FUMARATE DIHYDRATE 80; 4.5 UG/1; UG/1
2 AEROSOL RESPIRATORY (INHALATION) 2 TIMES DAILY
COMMUNITY

## 2024-07-09 RX ORDER — MECLIZINE HCL 12.5 MG 12.5 MG/1
12.5 TABLET ORAL 2 TIMES DAILY PRN
COMMUNITY

## 2024-07-09 RX ORDER — IBUPROFEN 200 MG
1 CAPSULE ORAL 2 TIMES DAILY
COMMUNITY

## 2024-07-09 RX ORDER — ESTRADIOL 0.1 MG/G
CREAM VAGINAL
COMMUNITY

## 2024-07-09 RX ORDER — FAMOTIDINE 20 MG/1
20 TABLET, FILM COATED ORAL 2 TIMES DAILY
COMMUNITY

## 2024-07-09 RX ORDER — METRONIDAZOLE 7.5 MG/G
GEL TOPICAL 2 TIMES DAILY
COMMUNITY

## 2024-07-09 RX ORDER — PREDNISONE 5 MG/1
5 TABLET ORAL DAILY
COMMUNITY
Start: 2024-07-11 | End: 2024-07-13

## 2024-07-09 RX ORDER — DESVENLAFAXINE 100 MG/1
100 TABLET, EXTENDED RELEASE ORAL DAILY
COMMUNITY

## 2024-07-09 RX ORDER — PREDNISONE 5 MG/1
10 TABLET ORAL DAILY
COMMUNITY
Start: 2024-07-08 | End: 2024-07-10

## 2024-07-09 NOTE — PROGRESS NOTES
"[facility] GERIATRICS    PRIMARY CARE PROVIDER AND CLINIC:  MYKE Colón CNP, [address] [facility] [address]  Chief Complaint   Patient presents with    [facility] Medical Record Number:  [id number]  Place of Service where encounter took place:  THE [facility]) [[id number]]    Heydi Barriga  is a 79 year old  (1945), living in above facility since 12/7/23 and now choosing to change PCPs to [facility].     HPI:    Complex medical history significant for afib with failed cardioversions, CAD, HTN, rheumatoid arthritis, COPD, DEMETRI on BiPAP, osteoporosis, hyperparathyroidism, depression, anxiety. Recently diagnosed with dementia and work up is in process.     She is a fairly good historian. Pleasant and talkative, conversation somewhat tangential. Currently on prednisone taper for flare of RA pain with improvement. Denies feeling ill. Good appetite. States that she's had a gradual weight loss over the past 3 yrs. Weight is down 14 lbs since 2/2024. Reports occasional reflux symptoms, but no significant GI issues. Voice has been \"gravelly\" for years and is unchanged. She initially noted cognitive decline several years ago. Had difficulty processing information and completing work related tasks. Understandably, she is  anxious about a diagnosis of dementia. Feels that she is managing well in the facility, although  misses her house.Ambulates with a walker and independent with cares. Meds are administered by staff, \" I was cavalier about taking them.\"     CODE STATUS/ADVANCE DIRECTIVES DISCUSSION:  No CPR- Do NOT Intubate    ALLERGIES:   Allergies   Allergen Reactions    Pregabalin Other (See Comments)     Post nasal drip    Atorvastatin Cough     Post nasal drip    Gabapentin Other (See Comments)     Felt dissasociated    Lisinopril     Oxycodone Other (See Comments)     Agitated, and crying    Ramipril     Walnuts [Nuts] Other (See Comments)     Scratchy  " throat      PAST MEDICAL HISTORY:   Past Medical History:   Diagnosis Date    Acute appendicitis     Bradycardia     Chest pain     Claustrophobia     COPD (chronic obstructive pulmonary disease) (H)     Coronary artery disease     coronary Atherosclerosis    Depression     Depressive disorder     Disorder of sacrum     JASON (generalized anxiety disorder)     Gastro-oesophageal reflux disease     Hyperlipidemia     Hyperparathyroidism (H24)     Hypertension     Myofascial pain     Obesity     Osteoarthritis     Knees and feet    PAF (paroxysmal atrial fibrillation) (H)     Polyarthralgia     Rheumatoid arthritis (H)     Right knee skin infection     7/2012    Rotator cuff dysfunction, left     Sleep apnea     has CPAP, but does not use, Claustrophobic    Synovial cyst popliteal space     Right    Ulnar neuropathy     Urinary incontinence     Vitamin D deficiency       PAST SURGICAL HISTORY:   has a past surgical history that includes Irrigation and debridement knee, combined (7/11/2012); Arthroplasty knee; colonoscopy; Dilation and curettage; Blepharoplasty, rhytidectomy, combined; zzc evacuation of hematoma; breast biopsy, core rt/lt; Arthroplasty minimally invasive knee (11/28/2012); Incision and drainage knee, combined (12/10/2012); and Bunionectomy arpit, repair hammer toe(s), combined (Right, 1/20/2020).  FAMILY HISTORY: family history is not on file.  SOCIAL HISTORY:   reports that she has quit smoking. She has never used smokeless tobacco. She reports current alcohol use. She reports that she does not use drugs.  Patient's living condition: lives in an assisted living facility   9/2015 and was living alone in her home. No children. 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       Current Outpatient Medications   Medication Sig Dispense Refill    albuterol (PROAIR HFA/PROVENTIL HFA/VENTOLIN HFA) 108 (90 Base) MCG/ACT inhaler Inhale 2 puffs into the lungs every 4  hours as needed for shortness of breath or wheezing      atomoxetine (STRATTERA) 40 MG capsule Take 40 mg by mouth daily      baricitinib (OLUMIANT) 2 MG tablet Take 2 mg by mouth daily      budesonide-formoterol (SYMBICORT) 80-4.5 MCG/ACT Inhaler Inhale 2 puffs into the lungs 2 times daily      buPROPion (WELLBUTRIN XL) 300 MG 24 hr tablet Take 300 mg by mouth daily (at 12:00)      calcium citrate (CITRACAL) 950 (200 Ca) MG tablet Take 1 tablet by mouth 2 times daily      Cholecalciferol (VITAMIN D) 2000 UNITS tablet Take 50 mcg by mouth daily      cinacalcet (SENSIPAR) 30 MG tablet Take 30 mg by mouth daily (at 12:00)      desvenlafaxine (PRISTIQ) 100 MG 24 hr tablet Take 100 mg by mouth daily      estradiol (ESTRACE) 0.1 MG/GM vaginal cream Every 4 days      famotidine (PEPCID) 20 MG tablet Take 20 mg by mouth 2 times daily      hydroxychloroquine (PLAQUENIL) 200 MG tablet Take 200 mg by mouth 2 times daily      losartan (COZAAR) 50 MG tablet Take 50 mg by mouth daily (at 12:00)      meclizine (ANTIVERT) 12.5 MG tablet Take 12.5 mg by mouth 2 times daily as needed for dizziness      metroNIDAZOLE (METROGEL) 0.75 % external gel Apply topically 2 times daily      rivaroxaban ANTICOAGULANT (XARELTO) 20 MG TABS tablet Take 20 mg by mouth daily (with dinner)      rosuvastatin (CRESTOR) 10 MG tablet Take 10 mg by mouth daily      vibegron (GEMTESA) 75 MG TABS tablet Take 75 mg by mouth daily      B Complex Vitamins (VITAMIN-B COMPLEX PO) Take 1 tablet by mouth daily (Patient not taking: Reported on 7/9/2024)      escitalopram (LEXAPRO) 20 MG tablet Take 40 mg by mouth daily (Take 2 X 20 mg = 40 mg dose) (Patient not taking: Reported on 7/9/2024)      PRASTERONE, DHEA, PO Take 5 mg by mouth daily  (Patient not taking: Reported on 7/9/2024)      predniSONE (DELTASONE) 5 MG tablet Take 5-25 mg by mouth daily as needed (arthritis)  (Patient not taking: Reported on 7/9/2024)      Probiotic Product (PROBIOTIC-10 PO) Take 1  capsule by mouth daily (at 12:00) (Patient not taking: Reported on 7/9/2024)      pyridOXINE (VITAMIN B6) 100 MG TABS Take 100 mg by mouth daily (at 12:00) (Patient not taking: Reported on 7/9/2024)      rivaroxaban ANTICOAGULANT (XARELTO) 20 MG TABS tablet Take 20 mg by mouth daily (with dinner) (Patient not taking: Reported on 7/9/2024)       No current facility-administered medications for this visit.       ROS:  10 point ROS of systems including Constitutional, Eyes, Respiratory, Cardiovascular, Gastroenterology, Genitourinary, Integumentary, Musculoskeletal, Psychiatric were all negative except for pertinent positives noted in my HPI.    Vitals:  /80   Pulse 85   Resp 20   Wt 84.4 kg (186 lb)   SpO2 94%   BMI 30.02 kg/m    Exam:  GENERAL APPEARANCE:  Alert, in no distress  ENT:  Tribal, oropharynx clear  EYES:  conjunctiva and lids normal  NECK:  no adenopathy, no thyromegaly  RESP:  lungs clear to auscultation   CV:   irregular rhythm, rate normal, no murmur, no LE edema   ABDOMEN:  soft, non-tender, no distension  M/S:   gait steady with walker. CAMARILLO with good strength. No acute joint inflammation   SKIN:  no rashes or open areas  PSYCH:  oriented X 3, memory impaired , affect and mood normal    Lab/Diagnostic data:  Recent labs in [facility] reviewed by me today.     PET brain 6/19/2024:  Impression:   Positive scan with pronounced amyloid deposition in the bilateral  cerebral hemispheres. BAPL: 3.    Echocardiogram 2018:  Final Conclusion Previous Study: 12/06/2016  1. Normal left ventricular chamber size. Normal left ventricular wall thickness. No regional wall motion abnormalities. Borderline  decreased global left ventricular systolic function. Calculated left ventricular ejection fraction (modified Simon technique) is 50 %.  2. Mild right ventricular chamber enlargement. Normal right ventricular systolic function.  3. Severe left atrial enlargement. Moderate right atrial enlargement.  4. Mildly  thickened mitral valve. Trivial mitral valve regurgitation.  5. Dilated IVC. > 50% collapse with inspiration.  6. Aortic valve sclerosis without stenosis. No aortic valve regurgitation. Mean transvalvular gradient is 10 mmHg.       ASSESSMENT / PLAN:  (F03.A0) Mild dementia without behavioral disturbance, psychotic disturbance, mood disturbance, or anxiety, unspecified dementia type (H)  (primary encounter diagnosis)  Comment: in the process of work up with [facility]. PET as above   Plan: AL staff assistance with cares, meals, activities, med admin. Follow up with Dr Butler    Chronic afib   Comment: rate controlled.   Followed by [facility], last seen 2/1/2024. Sotalol dc'd 11/2022. Failed cardioversion, most recently in 2018.   Plan: continue Xarelto     (I25.10) Coronary artery disease involving native coronary artery of native heart without angina pectoris  HTN   Comment: no chest pain or acute issues. /80 today   Nonobstructive disease on CTA 3/2016. Nuclear stress test 7/2018: small anteroseptal fixed defect, probable artifact    Plan: continue losartan, statin. Follow up with Cardiology.     (M06.9) Rheumatoid arthritis involving multiple sites, unspecified whether rheumatoid factor present (H)  Comment: currently on prednisone taper for flare.   [facility]   Plan: complete prednisone taper. Continue Plaquenil, baricitinib. Follow up with Rheum.     (J44.9) Chronic obstructive pulmonary disease without exacerbation (H)  DEMETRI  Comment: symptoms managed   Followed by [facility], last seen 4/26/2024.   Plan: continue Symbicort, albuterol prn. Continue BiPAP    (E21.0) Primary hyperparathyroidism (H24)  (M81.0) Age-related osteoporosis without current pathological fracture  Comment: followed by [facility] and last seen 6/18/2024. Hypercalcemia since 2015. Received Reclast 2022 and declined repeat infusion due to painful reaction.   Plan:  continue sensipar, calcium citrate. Continue vitamin D. Start Prolia next week as planned. Follow up with Endocrine as scheduled.     (F33.0) Major depressive disorder, recurrent episode, mild (H24)  ADHD  Comment: long standing, appears fairly well managed  Plan: continue Wellbutrin  mg daily, Pristiq 100 mg daily, Strattera 40 mg daily. Follow up with therapist and psychiatrist per usual routine.     GERD  Comment: intermittent symptoms  History of peptic ulcer disease  Plan: continue famotidine and monitor     (R32) Urinary incontinence, unspecified type  Comment: chronic. No s/s of infection   Plan: continue gemtesa. Follow up with [facility]     (Z71.89) Advanced directives, counseling/discussion  Comment: she has a healthcare directive and states she has been thinking more about this, in the setting of recently diagnosed dementia. She requests changing to DNR/DNI.   Plan: orders updated and POLST completed       Electronically signed by:  MYKE Colón CNP

## 2024-07-09 NOTE — LETTER
7/9/2024      Heydi Barriga  [address]  [place] [address]        [facility] GERIATRICS    PRIMARY CARE PROVIDER AND CLINIC:  MYKE Colón [facility], [address] [facility] [address]***  Chief Complaint   Patient presents with    Establish Care      [facility] Medical Record Number:  [id number]  Place of Service where encounter took place:  THE [facility]) [[id number]]    Heydi Barriga  is a 79 year old  (1945), living in above facility since 12/7/23 and now choosing to change PCPs to [facility]. .   HPI:    ***    CODE STATUS/ADVANCE DIRECTIVES DISCUSSION:  Prior  CPR/Full code   ALLERGIES:   Allergies   Allergen Reactions    Pregabalin Other (See Comments)     Post nasal drip    Atorvastatin Cough     Post nasal drip    Gabapentin Other (See Comments)     Felt dissasociated    Lisinopril     Oxycodone Other (See Comments)     Agitated, and crying    Ramipril     Walnuts [Nuts] Other (See Comments)     Scratchy  throat      PAST MEDICAL HISTORY:   Past Medical History:   Diagnosis Date    Acute appendicitis     Bradycardia     Chest pain     Claustrophobia     COPD (chronic obstructive pulmonary disease) (H)     Coronary artery disease     coronary Atherosclerosis    Depression     Depressive disorder     Disorder of sacrum     JASON (generalized anxiety disorder)     Gastro-oesophageal reflux disease     Hyperlipidemia     Hyperparathyroidism (H)     Hypertension     Myofascial pain     Obesity     Osteoarthritis     Knees and feet    PAF (paroxysmal atrial fibrillation) (H)     Polyarthralgia     Rheumatoid arthritis (H)     Right knee skin infection     7/2012    Rotator cuff dysfunction, left     Sleep apnea     has CPAP, but does not use, Claustrophobic    Synovial cyst popliteal space     Right    Ulnar neuropathy     Urinary incontinence     Vitamin D deficiency       PAST SURGICAL HISTORY:   has a past surgical history that includes Irrigation and debridement  knee, combined (7/11/2012); Arthroplasty knee; colonoscopy; Dilation and curettage; Blepharoplasty, rhytidectomy, combined; zzc evacuation of hematoma; breast biopsy, core rt/lt; Arthroplasty minimally invasive knee (11/28/2012); Incision and drainage knee, combined (12/10/2012); and Bunionectomy arpit, repair hammer toe(s), combined (Right, 1/20/2020).  FAMILY HISTORY: family history is not on file.  SOCIAL HISTORY:   reports that she has quit smoking. She has never used smokeless tobacco. She reports current alcohol use. She reports that she does not use drugs.  Patient's living condition: lives in an assisted living facility    Post Discharge Medication Reconciliation Status:   MED REC REQUIRED{TIP  Click the link below to document or use med rec list, use list to pull in response :561102}  Post Medication Reconciliation Status: {MED REC LIST:074585}       Current Outpatient Medications   Medication Sig Dispense Refill    albuterol (PROAIR HFA/PROVENTIL HFA/VENTOLIN HFA) 108 (90 Base) MCG/ACT inhaler Inhale 2 puffs into the lungs every 4 hours as needed for shortness of breath or wheezing      atomoxetine (STRATTERA) 40 MG capsule Take 40 mg by mouth daily      baricitinib (OLUMIANT) 2 MG tablet Take 2 mg by mouth daily      budesonide-formoterol (SYMBICORT) 80-4.5 MCG/ACT Inhaler Inhale 2 puffs into the lungs 2 times daily      buPROPion (WELLBUTRIN XL) 300 MG 24 hr tablet Take 300 mg by mouth daily (at 12:00)      calcium citrate (CITRACAL) 950 (200 Ca) MG tablet Take 1 tablet by mouth 2 times daily      Cholecalciferol (VITAMIN D) 2000 UNITS tablet Take 50 mcg by mouth daily      cinacalcet (SENSIPAR) 30 MG tablet Take 30 mg by mouth daily (at 12:00)      desvenlafaxine (PRISTIQ) 100 MG 24 hr tablet Take 100 mg by mouth daily      estradiol (ESTRACE) 0.1 MG/GM vaginal cream Every 4 days      famotidine (PEPCID) 20 MG tablet Take 20 mg by mouth 2 times daily      hydroxychloroquine (PLAQUENIL) 200 MG tablet  Take 200 mg by mouth 2 times daily      losartan (COZAAR) 50 MG tablet Take 50 mg by mouth daily (at 12:00)      meclizine (ANTIVERT) 12.5 MG tablet Take 12.5 mg by mouth 2 times daily as needed for dizziness      metroNIDAZOLE (METROGEL) 0.75 % external gel Apply topically 2 times daily      predniSONE (DELTASONE) 5 MG tablet Take 10 mg by mouth daily      [START ON 7/11/2024] predniSONE (DELTASONE) 5 MG tablet Take 5 mg by mouth daily      rivaroxaban ANTICOAGULANT (XARELTO) 20 MG TABS tablet Take 20 mg by mouth daily (with dinner)      rosuvastatin (CRESTOR) 10 MG tablet Take 10 mg by mouth daily      vibegron (GEMTESA) 75 MG TABS tablet Take 75 mg by mouth daily      acetaminophen (TYLENOL) 500 MG tablet Take 500-1,000 mg by mouth 3 times daily as needed for mild pain (Patient not taking: Reported on 7/9/2024)      aclidinium (TUDORZA PRESSAIR) 400 MCG/ACT inhaler Inhale 1-2 puffs into the lungs 2 times daily (Patient not taking: Reported on 7/9/2024)      Ascorbic Acid 1000 MG TABS Take 1,000 mg by mouth 2 times daily  (Patient not taking: Reported on 7/9/2024)      B Complex Vitamins (VITAMIN-B COMPLEX PO) Take 1 tablet by mouth daily (Patient not taking: Reported on 7/9/2024)      escitalopram (LEXAPRO) 20 MG tablet Take 40 mg by mouth daily (Take 2 X 20 mg = 40 mg dose) (Patient not taking: Reported on 7/9/2024)      HYDROcodone-acetaminophen (NORCO) 5-325 MG tablet Take 1-2 tablets by mouth every 4 hours as needed for moderate to severe pain (Patient not taking: Reported on 7/9/2024) 40 tablet 0    Multiple Vitamins-Minerals (MULTIVITAMIN OR) Take 1 tablet by mouth daily (at 12:00) (Patient not taking: Reported on 7/9/2024)      multivitamin (OCUVITE) TABS tablet Take 1 tablet by mouth At Bedtime  (Patient not taking: Reported on 7/9/2024)      Omega-3 Fatty Acids (FISH OIL) 1200 MG capsule Take 1,200 mg by mouth 2 times daily (Patient not taking: Reported on 7/9/2024)      omeprazole (PRILOSEC) 20 MG DR  capsule Take 20 mg by mouth 2 times daily  (Patient not taking: Reported on 7/9/2024)      ondansetron (ZOFRAN-ODT) 4 MG ODT tab Take 1-2 tablets (4-8 mg) by mouth every 6 hours as needed for nausea (Patient not taking: Reported on 7/9/2024) 12 tablet 1    PRASTERONE, DHEA, PO Take 5 mg by mouth daily  (Patient not taking: Reported on 7/9/2024)      predniSONE (DELTASONE) 5 MG tablet Take 5-25 mg by mouth daily as needed (arthritis)  (Patient not taking: Reported on 7/9/2024)      Probiotic Product (PROBIOTIC-10 PO) Take 1 capsule by mouth daily (at 12:00) (Patient not taking: Reported on 7/9/2024)      pyridOXINE (VITAMIN B6) 100 MG TABS Take 100 mg by mouth daily (at 12:00) (Patient not taking: Reported on 7/9/2024)      rivaroxaban ANTICOAGULANT (XARELTO) 20 MG TABS tablet Take 20 mg by mouth daily (with dinner) (Patient not taking: Reported on 7/9/2024)      senna-docusate (SENOKOT-S/PERICOLACE) 8.6-50 MG tablet Take 1-2 tablets by mouth 2 times daily (Patient not taking: Reported on 7/9/2024) 30 tablet 0    simvastatin (ZOCOR) 20 MG tablet Take 20 mg by mouth daily (at 12:00) (Patient not taking: Reported on 7/9/2024)      sotalol (BETAPACE) 80 MG tablet Take 80 mg by mouth 2 times daily (Patient not taking: Reported on 7/9/2024)      tofacitinib (XELJANZ) 5 MG tablet Take 5 mg by mouth 2 times daily (Patient not taking: Reported on 7/9/2024)      tretinoin (RETIN-A) 0.05 % cream Apply  topically nightly as needed. (Patient not taking: Reported on 7/9/2024)       No current facility-administered medications for this visit.       ROS:  {ROS FGS:959038}    Vitals:  BP (!) 156/111   Pulse 87   Temp 98.6  F (37  C)   Resp 20   Wt 84.4 kg (186 lb)   BMI 30.02 kg/m    Exam:  {Nursing home physical exam :891971}    Lab/Diagnostic data:  {fgslab:777904}    ASSESSMENT/PLAN:    {[facility] DX2:550709}    Orders:  {fgsorders:935750}  ***    Electronically signed by:  Chante Brown ***                      Sincerely,        MYKE Colón CNP

## 2024-07-31 ENCOUNTER — LAB REQUISITION (OUTPATIENT)
Dept: LAB | Facility: CLINIC | Age: 79
End: 2024-07-31
Payer: MEDICARE

## 2024-07-31 DIAGNOSIS — R30.0 DYSURIA: ICD-10-CM

## 2024-07-31 LAB
ALBUMIN UR-MCNC: 10 MG/DL
APPEARANCE UR: ABNORMAL
BILIRUB UR QL STRIP: NEGATIVE
COLOR UR AUTO: ABNORMAL
GLUCOSE UR STRIP-MCNC: NEGATIVE MG/DL
HGB UR QL STRIP: ABNORMAL
KETONES UR STRIP-MCNC: NEGATIVE MG/DL
LEUKOCYTE ESTERASE UR QL STRIP: ABNORMAL
NITRATE UR QL: NEGATIVE
PH UR STRIP: 7.5 [PH] (ref 5–7)
RBC URINE: 3 /HPF
SP GR UR STRIP: 1.01 (ref 1–1.03)
UROBILINOGEN UR STRIP-MCNC: NORMAL MG/DL
WBC CLUMPS #/AREA URNS HPF: PRESENT /HPF
WBC URINE: >182 /HPF

## 2024-07-31 PROCEDURE — 87086 URINE CULTURE/COLONY COUNT: CPT | Mod: ORL | Performed by: NURSE PRACTITIONER

## 2024-07-31 PROCEDURE — 87186 SC STD MICRODIL/AGAR DIL: CPT | Mod: ORL | Performed by: NURSE PRACTITIONER

## 2024-07-31 PROCEDURE — 81001 URINALYSIS AUTO W/SCOPE: CPT | Mod: ORL | Performed by: NURSE PRACTITIONER

## 2024-08-01 ENCOUNTER — TELEPHONE (OUTPATIENT)
Dept: GERIATRICS | Facility: CLINIC | Age: 79
End: 2024-08-01
Payer: MEDICARE

## 2024-08-01 DIAGNOSIS — N39.0 URINARY TRACT INFECTION WITHOUT HEMATURIA, SITE UNSPECIFIED: Primary | ICD-10-CM

## 2024-08-01 LAB — BACTERIA UR CULT: ABNORMAL

## 2024-08-01 RX ORDER — CEPHALEXIN 500 MG/1
500 CAPSULE ORAL 2 TIMES DAILY
Qty: 10 CAPSULE | Refills: 0 | Status: SHIPPED | OUTPATIENT
Start: 2024-08-01 | End: 2024-08-06

## 2024-08-01 NOTE — TELEPHONE ENCOUNTER
Heydi Barriga   1945    Orders 2024:  Cephalexin 500 mg po bid X 5 days for UTI  Sent to pharmacy    MYKE Colón CNP on 2024 at 1:33 PM

## 2024-08-18 VITALS
DIASTOLIC BLOOD PRESSURE: 80 MMHG | OXYGEN SATURATION: 94 % | BODY MASS INDEX: 30.02 KG/M2 | SYSTOLIC BLOOD PRESSURE: 124 MMHG | RESPIRATION RATE: 20 BRPM | WEIGHT: 186 LBS | HEART RATE: 85 BPM

## 2024-08-18 PROBLEM — R32 URINARY INCONTINENCE, UNSPECIFIED TYPE: Status: ACTIVE | Noted: 2024-08-18

## 2024-08-18 PROBLEM — M81.0 AGE-RELATED OSTEOPOROSIS WITHOUT CURRENT PATHOLOGICAL FRACTURE: Status: ACTIVE | Noted: 2024-08-18

## 2024-08-18 PROBLEM — M06.9 RHEUMATOID ARTHRITIS INVOLVING MULTIPLE SITES, UNSPECIFIED WHETHER RHEUMATOID FACTOR PRESENT (H): Status: ACTIVE | Noted: 2024-08-18

## 2024-08-18 PROBLEM — E21.0 PRIMARY HYPERPARATHYROIDISM (H): Status: ACTIVE | Noted: 2024-08-18

## 2024-08-18 PROBLEM — F33.0 MAJOR DEPRESSIVE DISORDER, RECURRENT EPISODE, MILD (H): Status: ACTIVE | Noted: 2024-08-18

## 2024-08-18 PROBLEM — I48.0 PAROXYSMAL ATRIAL FIBRILLATION (H): Status: ACTIVE | Noted: 2024-08-18

## 2024-08-18 PROBLEM — F03.A0 MILD DEMENTIA WITHOUT BEHAVIORAL DISTURBANCE, PSYCHOTIC DISTURBANCE, MOOD DISTURBANCE, OR ANXIETY, UNSPECIFIED DEMENTIA TYPE (H): Status: ACTIVE | Noted: 2024-08-18

## 2024-08-18 PROBLEM — J44.9 CHRONIC OBSTRUCTIVE PULMONARY DISEASE WITHOUT EXACERBATION (H): Status: ACTIVE | Noted: 2024-08-18

## 2024-08-28 DIAGNOSIS — Z53.9 DIAGNOSIS NOT YET DEFINED: Primary | ICD-10-CM

## 2024-08-28 PROCEDURE — G0179 MD RECERTIFICATION HHA PT: HCPCS | Performed by: NURSE PRACTITIONER

## 2024-09-04 DIAGNOSIS — M06.9 RHEUMATOID ARTHRITIS INVOLVING MULTIPLE SITES, UNSPECIFIED WHETHER RHEUMATOID FACTOR PRESENT (H): Primary | ICD-10-CM

## 2024-09-05 RX ORDER — HYDROXYCHLOROQUINE SULFATE 200 MG/1
200 TABLET, FILM COATED ORAL 2 TIMES DAILY
Qty: 180 TABLET | Refills: 3 | Status: SHIPPED | OUTPATIENT
Start: 2024-09-05

## 2024-09-18 NOTE — PROGRESS NOTES
"[facility] GERIATRICS    Chief Complaint   Patient presents with    RECHECK     HPI:  Heydi Barriga is a 79 year old  (1945), who is being seen today for an episodic care visit at: THE [facility]) [[id number]].   She moved to this facility 12/2023 and we assumed primary care 7/2024.   Medical history significant for afib with failed cardioversions, CAD, HTN, rheumatoid arthritis, COPD, DEMETRI on BiPAP, osteoporosis, hyperparathyroidism, depression, anxiety. Recently diagnosed with Alzheimer's disease and is followed by Dr Butler. Started Leqembi 9/16/2204.     Today's concern is:      Alzheimer's disease (H)  Chronic a-fib (H)  Coronary artery disease involving native coronary artery of native heart without angina pectoris  Primary hypertension  Chronic obstructive pulmonary disease without exacerbation (H)  Rheumatoid arthritis involving multiple sites, unspecified whether rheumatoid factor present (H)  Age-related osteoporosis without current pathological fracture  Major depressive disorder, recurrent episode, mild (H)  She is a good historian. Pleasant and talkative. Reports she is coping with the diagnosis of Alzhemier's disease, \"it's a lot to take in.\" She was tired after the first infusion of Leqembi, but no other side effects. Feeling well. Good appetite. No recent flares of pain. Ambulates with a walker. No falls. Independent with cares. Meds are administered by staff.       Allergies, and PMH/PSH reviewed in EPIC today    REVIEW OF SYSTEMS:  4 point ROS including Respiratory, CV, GI and , other than that noted in the HPI,  is negative    Objective:   /82   Pulse 76   Resp 18   Wt 85.3 kg (188 lb)   SpO2 (!) 76%   BMI 30.34 kg/m    GENERAL APPEARANCE:  Alert, in no distress  ENT:  [place]   EYES:  conjunctiva and lids normal  RESP:  lungs clear to auscultation   CV:   irregular rhythm, rate normal, no murmur, no LE edema   ABDOMEN:  soft, non-tender, no distension  M/S:   gait " steady with walker. CAMARILLO. Left leg discrepancy. Scoliosis. No acute joint inflammation   SKIN:  no visible rashes or open areas  PSYCH:  oriented X 3, memory impaired, mild word finding difficulty, affect and mood normal    Recent labs in [facility] reviewed by me today.     PET brain 6/19/2024:  Impression:   Positive scan with pronounced amyloid deposition in the bilateral  cerebral hemispheres. BAPL: 3.    ASSESSMENT / PLAN:    Alzheimer's disease   (primary encounter diagnosis)  Comment: mild deficits in short term memory and word finding difficulty   Plan: continue Leqembi per Dr Butler. AL staff assistance with cares, meals, activities, med admin    (I48.20) Chronic a-fib (H)  Comment: rate controlled in the 70s.   Followed by [facility], last seen 2/1/2024. Sotalol dc'd 11/2022. Failed cardioversion, most recently in 2018.   Plan: continue Xarelto     (I25.10) Coronary artery disease involving native coronary artery of native heart without angina pectoris  Comment: no acute issues   Nonobstructive disease on CTA 3/2016. Nuclear stress test 7/2018: small anteroseptal fixed defect, probable artifact    Plan: continue statin, losartan. Cardiology follow up per usual schedule     (I10) Primary hypertension  Comment: controlled with /82  Plan: continue losartan 50 mg daily     (J44.9) Chronic obstructive pulmonary disease without exacerbation (H)  Comment: symptoms managed   Plan: continue Symbicort, albuterol prn. Follow up with [facility] as scheduled.     (M06.9) Rheumatoid arthritis involving multiple sites, unspecified whether rheumatoid factor present (H)  Comment: pain controlled   Plan: continue plaquenil, baricitinib, prednisone prn flares. Follow [facility] with [facility] as scheduled.     (M81.0) Age-related osteoporosis without current pathological fracture  Comment: followed by [facility]. Received Reclast 2022 and had severe painful reaction   Plan: continue calcium,  vitamin D, Prolia per Endocrine.     (F33.0) Major depressive disorder, recurrent episode, mild (H)  Comment: predates cognitive decline, well managed   Plan: continue Wellbutrin, Pristiq, Strattera. Follow up with her Psych and her therapist as scheduled.       Electronically signed by: MYKE Colón CNP

## 2024-09-19 ENCOUNTER — ASSISTED LIVING VISIT (OUTPATIENT)
Dept: GERIATRICS | Facility: CLINIC | Age: 79
End: 2024-09-19
Payer: MEDICARE

## 2024-09-19 DIAGNOSIS — I48.20 CHRONIC A-FIB (H): ICD-10-CM

## 2024-09-19 DIAGNOSIS — F02.80 ALZHEIMER'S DISEASE (H): Primary | ICD-10-CM

## 2024-09-19 DIAGNOSIS — M06.9 RHEUMATOID ARTHRITIS INVOLVING MULTIPLE SITES, UNSPECIFIED WHETHER RHEUMATOID FACTOR PRESENT (H): ICD-10-CM

## 2024-09-19 DIAGNOSIS — J44.9 CHRONIC OBSTRUCTIVE PULMONARY DISEASE WITHOUT EXACERBATION (H): ICD-10-CM

## 2024-09-19 DIAGNOSIS — I10 PRIMARY HYPERTENSION: ICD-10-CM

## 2024-09-19 DIAGNOSIS — F33.0 MAJOR DEPRESSIVE DISORDER, RECURRENT EPISODE, MILD (H): ICD-10-CM

## 2024-09-19 DIAGNOSIS — I25.10 CORONARY ARTERY DISEASE INVOLVING NATIVE CORONARY ARTERY OF NATIVE HEART WITHOUT ANGINA PECTORIS: ICD-10-CM

## 2024-09-19 DIAGNOSIS — M41.9 SCOLIOSIS, UNSPECIFIED SCOLIOSIS TYPE, UNSPECIFIED SPINAL REGION: ICD-10-CM

## 2024-09-19 DIAGNOSIS — G30.9 ALZHEIMER'S DISEASE (H): Primary | ICD-10-CM

## 2024-09-19 DIAGNOSIS — M81.0 AGE-RELATED OSTEOPOROSIS WITHOUT CURRENT PATHOLOGICAL FRACTURE: ICD-10-CM

## 2024-09-19 PROCEDURE — 99349 HOME/RES VST EST MOD MDM 40: CPT | Performed by: NURSE PRACTITIONER

## 2024-09-19 NOTE — LETTER
9/19/2024      Heydi Barriga  [address]  [place] [address]        No notes on file      Sincerely,        MYKE Colón CNP

## 2024-10-06 VITALS
BODY MASS INDEX: 30.34 KG/M2 | RESPIRATION RATE: 18 BRPM | HEART RATE: 76 BPM | DIASTOLIC BLOOD PRESSURE: 82 MMHG | OXYGEN SATURATION: 76 % | WEIGHT: 188 LBS | SYSTOLIC BLOOD PRESSURE: 130 MMHG

## 2024-10-06 PROBLEM — I48.20 CHRONIC A-FIB (H): Status: ACTIVE | Noted: 2024-10-06

## 2024-10-06 PROBLEM — G30.9 ALZHEIMER'S DISEASE (H): Status: ACTIVE | Noted: 2024-08-18

## 2024-10-06 PROBLEM — F02.80 ALZHEIMER'S DISEASE (H): Status: ACTIVE | Noted: 2024-08-18

## 2024-10-15 ENCOUNTER — ASSISTED LIVING VISIT (OUTPATIENT)
Dept: GERIATRICS | Facility: CLINIC | Age: 79
End: 2024-10-15
Payer: MEDICARE

## 2024-10-15 VITALS
RESPIRATION RATE: 16 BRPM | HEART RATE: 75 BPM | TEMPERATURE: 98.1 F | SYSTOLIC BLOOD PRESSURE: 143 MMHG | WEIGHT: 190 LBS | BODY MASS INDEX: 30.67 KG/M2 | DIASTOLIC BLOOD PRESSURE: 81 MMHG

## 2024-10-15 DIAGNOSIS — M06.9 RHEUMATOID ARTHRITIS INVOLVING MULTIPLE SITES, UNSPECIFIED WHETHER RHEUMATOID FACTOR PRESENT (H): Primary | ICD-10-CM

## 2024-10-15 PROCEDURE — 99348 HOME/RES VST EST LOW MDM 30: CPT | Performed by: NURSE PRACTITIONER

## 2024-10-15 RX ORDER — ACETAMINOPHEN 500 MG
1000 TABLET ORAL 3 TIMES DAILY PRN
Qty: 120 TABLET | Refills: 11 | Status: SHIPPED | OUTPATIENT
Start: 2024-10-15

## 2024-10-15 NOTE — PROGRESS NOTES
[facility] GERIATRICS    Chief Complaint   Patient presents with    RECHECK     HPI:  Heydi Barriga is a 79 year old  (1945), who is being seen today for an episodic care visit at: THE [facility]) [[id number]].   She moved to this facility 12/2023 and we assumed primary care 7/2024.   Medical history significant for afib with failed cardioversions, CAD, HTN, rheumatoid arthritis, COPD, DEMETRI on BiPAP, osteoporosis, hyperparathyroidism, depression, anxiety. Recently diagnosed with Alzheimer's disease and is followed by Dr Butler. Started Leqembi 9/16/2204.     Today's concern is:   Rheumatoid arthritis involving multiple sites, unspecified whether rheumatoid factor present (H)  She is seen today after receiving different requests from nurses for tylenol, vitamin C and probiotic. Reports feeling well but had a flair of RA pain and did not have an order for tylenol. She requests tylenol prn. Also requests using her own supply of vitamin C prn for cold symptoms and probiotic. Up with walker. Independent with cares. Meds are administered by staff.       Allergies, and PMH/PSH reviewed in EPIC today    REVIEW OF SYSTEMS:  4 point ROS including Respiratory, CV, GI and , other than that noted in the HPI,  is negative    Objective:   BP (!) 143/81   Pulse 75   Temp 98.1  F (36.7  C)   Resp 16   Wt 86.2 kg (190 lb)   BMI 30.67 kg/m    GENERAL APPEARANCE:  Alert, in no distress  ENT:  [place]   EYES:  conjunctiva and lids normal  RESP:  no respiratory distress   CV:   no LE edema   M/S:   gait steady with walker. CAMARILLO. Left leg discrepancy. Scoliosis. No acute joint inflammation   SKIN:  no visible rashes or open areas  PSYCH:  oriented X 3, memory impaired, mild word finding difficulty, affect and mood normal    Recent labs in [facility] reviewed by me today.     ASSESSMENT / PLAN:  (M06.9) Rheumatoid arthritis involving multiple sites, unspecified whether rheumatoid factor present (H)  (primary encounter  diagnosis)  Comment: mild flare of pain, requests tylenol prn   Plan: tylenol 1000 mg tid prn. Continue plaquenil, baricitinib, prednisone prn severe flare. Rheumatology follow up as scheduled.   She may use her own supply of probiotic and vitamin C prn, keep in apartment and self administer.   Discussed with staff.         Electronically signed by: MYKE Colón CNP

## 2024-10-15 NOTE — LETTER
10/15/2024      Heydi Barriga  [address]  [facility] [address]        [facility] GERIATRICS    Chief Complaint   Patient presents with     RECHECK     HPI:  Heydi Barriga is a 79 year old  (1945), who is being seen today for an episodic care visit at: THE [facility]) [[id number]].   She moved to this facility 12/2023 and we assumed primary care 7/2024.   Medical history significant for afib with failed cardioversions, CAD, HTN, rheumatoid arthritis, COPD, DEMETRI on BiPAP, osteoporosis, hyperparathyroidism, depression, anxiety. Recently diagnosed with Alzheimer's disease and is followed by Dr Butler. Started Leqembi 9/16/2204.     Today's concern is:   Rheumatoid arthritis involving multiple sites, unspecified whether rheumatoid factor present (H)  She is seen today after receiving different requests from nurses for tylenol, vitamin C and probiotic. Reports feeling well but had a flair of RA pain and did not have an order for tylenol. She requests tylenol prn. Also requests using her own supply of vitamin C prn for cold symptoms and probiotic. Up with walker. Independent with cares. Meds are administered by staff.       Allergies, and PMH/PSH reviewed in EPIC today    REVIEW OF SYSTEMS:  4 point ROS including Respiratory, CV, GI and , other than that noted in the HPI,  is negative    Objective:   BP (!) 143/81   Pulse 75   Temp 98.1  F (36.7  C)   Resp 16   Wt 86.2 kg (190 lb)   BMI 30.67 kg/m    GENERAL APPEARANCE:  Alert, in no distress  ENT:  [place]   EYES:  conjunctiva and lids normal  RESP:  no respiratory distress   CV:   no LE edema   M/S:   gait steady with walker. CAMAR[place]O. Left leg discrepancy. Scoliosis. No acute joint inflammation   SKIN:  no visible rashes or open areas  PSYCH:  oriented X 3, memory impaired, mild word finding difficulty, affect and mood normal    Recent labs in [place] reviewed by me today.     ASSESSMENT / PLAN:  (M06.9) Rheumatoid arthritis involving  multiple sites, unspecified whether rheumatoid factor present (H)  (primary encounter diagnosis)  Comment: mild flare of pain, requests tylenol prn   Plan: tylenol 1000 mg tid prn. Continue plaquenil, baricitinib, prednisone prn severe flare. Rheumatology follow up as scheduled.   She may use her own supply of probiotic and vitamin C prn, keep in apartment and self administer.   Discussed with staff.         Electronically signed by: MYKE Colón CNP           Sincerely,        MYKE Colón CNP

## 2024-11-23 DIAGNOSIS — M06.9 RHEUMATOID ARTHRITIS INVOLVING MULTIPLE SITES, UNSPECIFIED WHETHER RHEUMATOID FACTOR PRESENT (H): Primary | ICD-10-CM

## 2024-11-25 RX ORDER — BARICITINIB 2 MG/1
2 TABLET, FILM COATED ORAL DAILY
Qty: 90 TABLET | Refills: 3 | Status: SHIPPED | OUTPATIENT
Start: 2024-11-25

## 2024-12-26 DIAGNOSIS — M06.9 RHEUMATOID ARTHRITIS INVOLVING MULTIPLE SITES, UNSPECIFIED WHETHER RHEUMATOID FACTOR PRESENT (H): ICD-10-CM

## 2025-01-15 ENCOUNTER — LAB REQUISITION (OUTPATIENT)
Dept: LAB | Facility: CLINIC | Age: 80
End: 2025-01-15
Payer: MEDICARE

## 2025-01-15 DIAGNOSIS — E21.0 PRIMARY HYPERPARATHYROIDISM: ICD-10-CM

## 2025-01-16 PROCEDURE — 82310 ASSAY OF CALCIUM: CPT | Mod: ORL | Performed by: INTERNAL MEDICINE

## 2025-01-16 PROCEDURE — P9603 ONE-WAY ALLOW PRORATED MILES: HCPCS | Mod: ORL | Performed by: INTERNAL MEDICINE

## 2025-01-16 PROCEDURE — 36415 COLL VENOUS BLD VENIPUNCTURE: CPT | Mod: ORL | Performed by: INTERNAL MEDICINE

## 2025-01-17 LAB — CALCIUM SERPL-MCNC: 9.7 MG/DL (ref 8.8–10.4)

## 2025-01-21 DIAGNOSIS — I10 PRIMARY HYPERTENSION: ICD-10-CM

## 2025-01-21 DIAGNOSIS — E55.9 VITAMIN D DEFICIENCY: Primary | ICD-10-CM

## 2025-01-21 DIAGNOSIS — K21.9 GASTROESOPHAGEAL REFLUX DISEASE WITHOUT ESOPHAGITIS: Primary | ICD-10-CM

## 2025-01-21 DIAGNOSIS — I48.20 CHRONIC A-FIB (H): ICD-10-CM

## 2025-01-21 RX ORDER — FAMOTIDINE 20 MG/1
20 TABLET, FILM COATED ORAL 2 TIMES DAILY
Qty: 180 TABLET | Refills: 97 | Status: SHIPPED | OUTPATIENT
Start: 2025-01-21

## 2025-01-21 RX ORDER — RIVAROXABAN 20 MG/1
20 TABLET, FILM COATED ORAL DAILY
Qty: 90 TABLET | Refills: 3 | Status: SHIPPED | OUTPATIENT
Start: 2025-01-21

## 2025-01-21 RX ORDER — CHOLECALCIFEROL (VITAMIN D3) 50 MCG
1 TABLET ORAL EVERY MORNING
Qty: 90 TABLET | Refills: 3 | Status: SHIPPED | OUTPATIENT
Start: 2025-01-21

## 2025-01-21 RX ORDER — LOSARTAN POTASSIUM 50 MG/1
50 TABLET ORAL DAILY
Qty: 90 TABLET | Refills: 3 | Status: SHIPPED | OUTPATIENT
Start: 2025-01-21

## 2025-01-29 ENCOUNTER — DOCUMENTATION ONLY (OUTPATIENT)
Dept: ANTICOAGULATION | Facility: CLINIC | Age: 80
End: 2025-01-29
Payer: MEDICARE

## 2025-01-29 NOTE — PROGRESS NOTES
Anticoagulant Therapeutic Duplication    Duplicate orders identified: identical order(s)    The duplicate anticoagulant order(s) has been discontinued    Active anticoagulant: rivaroxaban (Xarelto)    Plan made per [facility] anticoagulation protocol.    Kalyani Dudley RN  1/29/2025

## 2025-02-05 DIAGNOSIS — N39.0 RECURRENT UTI: Primary | ICD-10-CM

## 2025-02-05 RX ORDER — ESTRADIOL 0.1 MG/G
CREAM VAGINAL
Qty: 42.5 G | Refills: 10 | Status: SHIPPED | OUTPATIENT
Start: 2025-02-05

## 2025-02-06 ENCOUNTER — HOSPITAL ENCOUNTER (EMERGENCY)
Facility: CLINIC | Age: 80
Discharge: HOME | End: 2025-02-06
Attending: EMERGENCY MEDICINE | Admitting: EMERGENCY MEDICINE
Payer: MEDICARE

## 2025-02-06 ENCOUNTER — APPOINTMENT (OUTPATIENT)
Dept: CT IMAGING | Facility: CLINIC | Age: 80
End: 2025-02-06
Attending: PHYSICIAN ASSISTANT
Payer: MEDICARE

## 2025-02-06 VITALS
HEART RATE: 75 BPM | DIASTOLIC BLOOD PRESSURE: 76 MMHG | RESPIRATION RATE: 18 BRPM | TEMPERATURE: 97.9 F | SYSTOLIC BLOOD PRESSURE: 134 MMHG | OXYGEN SATURATION: 100 %

## 2025-02-06 DIAGNOSIS — I10 HYPERTENSION: ICD-10-CM

## 2025-02-06 LAB
ALBUMIN SERPL BCG-MCNC: 3.9 G/DL (ref 3.5–5.2)
ALBUMIN SERPL BCG-MCNC: 3.9 G/DL (ref 3.5–5.2)
ALP SERPL-CCNC: 55 U/L (ref 40–150)
ALP SERPL-CCNC: 56 U/L (ref 40–150)
ALT SERPL W P-5'-P-CCNC: 10 U/L (ref 0–50)
ALT SERPL W P-5'-P-CCNC: 11 U/L (ref 0–50)
ANION GAP SERPL CALCULATED.3IONS-SCNC: 10 MMOL/L (ref 7–15)
ANION GAP SERPL CALCULATED.3IONS-SCNC: 10 MMOL/L (ref 7–15)
AST SERPL W P-5'-P-CCNC: 19 U/L (ref 0–45)
AST SERPL W P-5'-P-CCNC: 23 U/L (ref 0–45)
ATRIAL RATE - MUSE: NORMAL BPM
BASOPHILS # BLD AUTO: 0 10E3/UL (ref 0–0.2)
BASOPHILS NFR BLD AUTO: 0 %
BILIRUB SERPL-MCNC: 0.2 MG/DL
BILIRUB SERPL-MCNC: 0.3 MG/DL
BUN SERPL-MCNC: 18 MG/DL (ref 8–23)
BUN SERPL-MCNC: 20 MG/DL (ref 8–23)
CALCIUM SERPL-MCNC: 10 MG/DL (ref 8.8–10.4)
CALCIUM SERPL-MCNC: 10.2 MG/DL (ref 8.8–10.4)
CHLORIDE SERPL-SCNC: 102 MMOL/L (ref 98–107)
CHLORIDE SERPL-SCNC: 102 MMOL/L (ref 98–107)
CREAT SERPL-MCNC: 0.69 MG/DL (ref 0.51–0.95)
CREAT SERPL-MCNC: 0.74 MG/DL (ref 0.51–0.95)
DIASTOLIC BLOOD PRESSURE - MUSE: NORMAL MMHG
EGFRCR SERPLBLD CKD-EPI 2021: 81 ML/MIN/1.73M2
EGFRCR SERPLBLD CKD-EPI 2021: 87 ML/MIN/1.73M2
EOSINOPHIL # BLD AUTO: 0.1 10E3/UL (ref 0–0.7)
EOSINOPHIL NFR BLD AUTO: 1 %
ERYTHROCYTE [DISTWIDTH] IN BLOOD BY AUTOMATED COUNT: 15.9 % (ref 10–15)
GLUCOSE SERPL-MCNC: 84 MG/DL (ref 70–99)
GLUCOSE SERPL-MCNC: 95 MG/DL (ref 70–99)
HCO3 SERPL-SCNC: 25 MMOL/L (ref 22–29)
HCO3 SERPL-SCNC: 25 MMOL/L (ref 22–29)
HCT VFR BLD AUTO: 36 % (ref 35–47)
HGB BLD-MCNC: 11.7 G/DL (ref 11.7–15.7)
IMM GRANULOCYTES # BLD: 0 10E3/UL
IMM GRANULOCYTES NFR BLD: 0 %
INTERPRETATION ECG - MUSE: NORMAL
LYMPHOCYTES # BLD AUTO: 1.6 10E3/UL (ref 0.8–5.3)
LYMPHOCYTES NFR BLD AUTO: 33 %
MCH RBC QN AUTO: 30.2 PG (ref 26.5–33)
MCHC RBC AUTO-ENTMCNC: 32.5 G/DL (ref 31.5–36.5)
MCV RBC AUTO: 93 FL (ref 78–100)
MONOCYTES # BLD AUTO: 0.5 10E3/UL (ref 0–1.3)
MONOCYTES NFR BLD AUTO: 11 %
NEUTROPHILS # BLD AUTO: 2.7 10E3/UL (ref 1.6–8.3)
NEUTROPHILS NFR BLD AUTO: 55 %
NRBC # BLD AUTO: 0 10E3/UL
NRBC BLD AUTO-RTO: 0 /100
NT-PROBNP SERPL-MCNC: 140 PG/ML (ref 0–1800)
NT-PROBNP SERPL-MCNC: 145 PG/ML (ref 0–1800)
P AXIS - MUSE: NORMAL DEGREES
PLATELET # BLD AUTO: 282 10E3/UL (ref 150–450)
POTASSIUM SERPL-SCNC: 4.1 MMOL/L (ref 3.4–5.3)
POTASSIUM SERPL-SCNC: 4.3 MMOL/L (ref 3.4–5.3)
PR INTERVAL - MUSE: NORMAL MS
PROT SERPL-MCNC: 6.3 G/DL (ref 6.4–8.3)
PROT SERPL-MCNC: 6.5 G/DL (ref 6.4–8.3)
QRS DURATION - MUSE: 104 MS
QT - MUSE: 420 MS
QTC - MUSE: 446 MS
R AXIS - MUSE: 36 DEGREES
RBC # BLD AUTO: 3.87 10E6/UL (ref 3.8–5.2)
SODIUM SERPL-SCNC: 137 MMOL/L (ref 135–145)
SODIUM SERPL-SCNC: 137 MMOL/L (ref 135–145)
SYSTOLIC BLOOD PRESSURE - MUSE: NORMAL MMHG
T AXIS - MUSE: 57 DEGREES
TROPONIN T SERPL HS-MCNC: 17 NG/L
TROPONIN T SERPL HS-MCNC: 18 NG/L
VENTRICULAR RATE- MUSE: 68 BPM
WBC # BLD AUTO: 4.9 10E3/UL (ref 4–11)

## 2025-02-06 PROCEDURE — 84484 ASSAY OF TROPONIN QUANT: CPT | Performed by: PHYSICIAN ASSISTANT

## 2025-02-06 PROCEDURE — 83880 ASSAY OF NATRIURETIC PEPTIDE: CPT | Performed by: PHYSICIAN ASSISTANT

## 2025-02-06 PROCEDURE — 999N000285 HC STATISTIC VASC ACCESS LAB DRAW WITH PIV START

## 2025-02-06 PROCEDURE — 36415 COLL VENOUS BLD VENIPUNCTURE: CPT | Performed by: PHYSICIAN ASSISTANT

## 2025-02-06 PROCEDURE — 80053 COMPREHEN METABOLIC PANEL: CPT | Performed by: PHYSICIAN ASSISTANT

## 2025-02-06 PROCEDURE — 99284 EMERGENCY DEPT VISIT MOD MDM: CPT | Mod: FS | Performed by: EMERGENCY MEDICINE

## 2025-02-06 PROCEDURE — 93005 ELECTROCARDIOGRAM TRACING: CPT | Performed by: EMERGENCY MEDICINE

## 2025-02-06 PROCEDURE — 99285 EMERGENCY DEPT VISIT HI MDM: CPT | Mod: 25 | Performed by: EMERGENCY MEDICINE

## 2025-02-06 PROCEDURE — 83880 ASSAY OF NATRIURETIC PEPTIDE: CPT | Performed by: EMERGENCY MEDICINE

## 2025-02-06 PROCEDURE — 70450 CT HEAD/BRAIN W/O DYE: CPT | Mod: 26 | Performed by: RADIOLOGY

## 2025-02-06 PROCEDURE — 84155 ASSAY OF PROTEIN SERUM: CPT | Performed by: PHYSICIAN ASSISTANT

## 2025-02-06 PROCEDURE — 85004 AUTOMATED DIFF WBC COUNT: CPT | Performed by: PHYSICIAN ASSISTANT

## 2025-02-06 PROCEDURE — 84450 TRANSFERASE (AST) (SGOT): CPT | Performed by: PHYSICIAN ASSISTANT

## 2025-02-06 PROCEDURE — 93010 ELECTROCARDIOGRAM REPORT: CPT | Performed by: EMERGENCY MEDICINE

## 2025-02-06 PROCEDURE — 82040 ASSAY OF SERUM ALBUMIN: CPT | Performed by: PHYSICIAN ASSISTANT

## 2025-02-06 PROCEDURE — 80053 COMPREHEN METABOLIC PANEL: CPT | Performed by: EMERGENCY MEDICINE

## 2025-02-06 PROCEDURE — 70450 CT HEAD/BRAIN W/O DYE: CPT

## 2025-02-06 ASSESSMENT — VISUAL ACUITY: OU: 1

## 2025-02-06 ASSESSMENT — ACTIVITIES OF DAILY LIVING (ADL)
ADLS_ACUITY_SCORE: 41

## 2025-02-06 ASSESSMENT — COLUMBIA-SUICIDE SEVERITY RATING SCALE - C-SSRS
1. IN THE PAST MONTH, HAVE YOU WISHED YOU WERE DEAD OR WISHED YOU COULD GO TO SLEEP AND NOT WAKE UP?: NO
6. HAVE YOU EVER DONE ANYTHING, STARTED TO DO ANYTHING, OR PREPARED TO DO ANYTHING TO END YOUR LIFE?: NO
2. HAVE YOU ACTUALLY HAD ANY THOUGHTS OF KILLING YOURSELF IN THE PAST MONTH?: NO

## 2025-02-06 NOTE — ED PROVIDER NOTES
ED Provider Note  [facility]      History     Chief Complaint   Patient presents with    Hypertension     HPI  Heydi Barriga is an 79 year old female with a history of atrial fibrillation since on Xarelto), CAD, HTN (on losartan 50 mg nightly), rheumatoid arthritis, Alzheimer's disease who presents to the ED for evaluation of hypertension.  States that her blood pressure has been elevated with systolic values from 130s to 150s, found to be 160 earlier this afternoon.  She was advised to present to the emergency department for further evaluation given elevated blood pressure.  On arrival, blood pressure was 134/76.  States that she is adherent to medications, takes losartan 50 mg nightly as prescribed.  Noted mild swelling in the lower extremities in the hands over the past 2 to 3 weeks, more pronounced in the right lower extremity.  Has gained about 10 pounds of weight over the past month as well.  He denies any chest pain or shortness of breath, diaphoresis, dizziness or lightheadedness or double vision currently.  She does have a very mild headache felt in the frontal area currently.  No recent medication changes.      A medically appropriate review of systems was performed with pertinent positives and negatives noted in the HPI, and all other systems negative.    Physical Exam   BP: 134/76  Pulse: 75  Temp: 97.9  F (36.6  C)  Resp: 18  SpO2: 100 %  Physical Exam  Vitals reviewed.   Constitutional:       General: She is not in acute distress.     Appearance: Normal appearance. She is not ill-appearing, toxic-appearing or diaphoretic.   HENT:      Head: Normocephalic and atraumatic.      Right Ear: No middle ear effusion. No mastoid tenderness. Tympanic membrane is not injected, perforated, erythematous, retracted or bulging.      Left Ear:  No middle ear effusion. No mastoid tenderness. Tympanic membrane is not injected, perforated, erythematous, retracted or bulging.      Nose: Nose  normal. No congestion or rhinorrhea.      Mouth/Throat:      Mouth: Mucous membranes are moist.      Pharynx: Oropharynx is clear. No oropharyngeal exudate or posterior oropharyngeal erythema.   Eyes:      General: Vision grossly intact. No visual field deficit or scleral icterus.     Extraocular Movements: Extraocular movements intact.      Right eye: Normal extraocular motion and no nystagmus.      Left eye: Normal extraocular motion and no nystagmus.      Conjunctiva/sclera:      Right eye: Right conjunctiva is not injected.      Left eye: Left conjunctiva is not injected.      Pupils: Pupils are equal, round, and reactive to light. Pupils are equal.      Right eye: Pupil is reactive and not sluggish.      Left eye: Pupil is reactive and not sluggish.      Funduscopic exam:     Right eye: Red reflex present.         Left eye: Red reflex present.  Cardiovascular:      Rate and Rhythm: Normal rate and regular rhythm.      Pulses: Normal pulses.      Heart sounds: Normal heart sounds. No murmur heard.  Pulmonary:      Effort: Pulmonary effort is normal. No respiratory distress.      Breath sounds: Normal breath sounds. No stridor. No wheezing, rhonchi or rales.   Chest:      Chest wall: No tenderness.   Abdominal:      General: Abdomen is flat. Bowel sounds are normal. There is no distension.      Tenderness: There is no abdominal tenderness. There is no right CVA tenderness, left CVA tenderness or guarding.   Musculoskeletal:         General: No swelling or tenderness.      Cervical back: Neck supple. No rigidity. No muscular tenderness.      Right lower leg: Edema present.      Left lower leg: No edema.   Lymphadenopathy:      Cervical: No cervical adenopathy.      Right cervical: No superficial, deep or posterior cervical adenopathy.     Left cervical: No superficial, deep or posterior cervical adenopathy.   Neurological:      General: No focal deficit present.      Mental Status: She is alert and oriented to  person, place, and time.      Cranial Nerves: No cranial nerve deficit.      Sensory: No sensory deficit.           ED Course, Procedures, & Data      Procedures                Results for orders placed or performed during the hospital encounter of 02/06/25   CT Head w/o Contrast     Status: None    Narrative    Head CT without contrast    History: occasional double vision.  Comparison: 6/19/2024 PET/CT    Technique: Axial images through the brain obtained without intravenous  contrast, reviewed in bone, brain, and subdural windows.    Findings: There is no evidence of intracranial hemorrhage. There is no  mass-effect or midline shift. Ventricles and sulci are mildly  enlarged. Confluent low-attenuation in the periventricular white  matter, greatest in the frontal lobes. Atherosclerotic vascular  calcifications.    The visualized portions of the paranasal sinuses and mastoid air cells  are unremarkable on bone windows. Leftward nasal septal deviation.  Surgical clips in the temporal regions.      Impression    Impression: No acute intracranial pathology. Moderate presumed chronic  small vessel ischemic disease and cerebral volume loss.    VERONICA JOHNS MD         SYSTEM ID:  [id number]   Comprehensive metabolic panel     Status: Normal   Result Value Ref Range    Sodium 137 135 - 145 mmol/L    Potassium 4.3 3.4 - 5.3 mmol/L    Carbon Dioxide (CO2) 25 22 - 29 mmol/L    Anion Gap 10 7 - 15 mmol/L    Urea Nitrogen 20.0 8.0 - 23.0 mg/dL    Creatinine 0.74 0.51 - 0.95 mg/dL    GFR Estimate 81 >60 mL/min/1.73m2    Calcium 10.0 8.8 - 10.4 mg/dL    Chloride 102 98 - 107 mmol/L    Glucose 84 70 - 99 mg/dL    Alkaline Phosphatase 55 40 - 150 U/L    AST 23 0 - 45 U/L    ALT 11 0 - 50 U/L    Protein Total 6.5 6.4 - 8.3 g/dL    Albumin 3.9 3.5 - 5.2 g/dL    Bilirubin Total 0.3 <=1.2 mg/dL   Troponin T, High Sensitivity     Status: Abnormal   Result Value Ref Range    Troponin T, High Sensitivity 18 (H) <=14 ng/L   Nt probnp  inpatient (BNP)     Status: Normal   Result Value Ref Range    N terminal Pro BNP Inpatient 140 0 - 1,800 pg/mL   CBC with platelets and differential     Status: Abnormal   Result Value Ref Range    WBC Count 4.9 4.0 - 11.0 10e3/uL    RBC Count 3.87 3.80 - 5.20 10e6/uL    Hemoglobin 11.7 11.7 - 15.7 g/dL    Hematocrit 36.0 35.0 - 47.0 %    MCV 93 78 - 100 fL    MCH 30.2 26.5 - 33.0 pg    MCHC 32.5 31.5 - 36.5 g/dL    RDW 15.9 (H) 10.0 - 15.0 %    Platelet Count 282 150 - 450 10e3/uL    % Neutrophils 55 %    % Lymphocytes 33 %    % Monocytes 11 %    % Eosinophils 1 %    % Basophils 0 %    % Immature Granulocytes 0 %    NRBCs per 100 WBC 0 <1 /100    Absolute Neutrophils 2.7 1.6 - 8.3 10e3/uL    Absolute Lymphocytes 1.6 0.8 - 5.3 10e3/uL    Absolute Monocytes 0.5 0.0 - 1.3 10e3/uL    Absolute Eosinophils 0.1 0.0 - 0.7 10e3/uL    Absolute Basophils 0.0 0.0 - 0.2 10e3/uL    Absolute Immature Granulocytes 0.0 <=0.4 10e3/uL    Absolute NRBCs 0.0 10e3/uL   Comprehensive metabolic panel     Status: Abnormal   Result Value Ref Range    Sodium 137 135 - 145 mmol/L    Potassium 4.1 3.4 - 5.3 mmol/L    Carbon Dioxide (CO2) 25 22 - 29 mmol/L    Anion Gap 10 7 - 15 mmol/L    Urea Nitrogen 18.0 8.0 - 23.0 mg/dL    Creatinine 0.69 0.51 - 0.95 mg/dL    GFR Estimate 87 >60 mL/min/1.73m2    Calcium 10.2 8.8 - 10.4 mg/dL    Chloride 102 98 - 107 mmol/L    Glucose 95 70 - 99 mg/dL    Alkaline Phosphatase 56 40 - 150 U/L    AST 19 0 - 45 U/L    ALT 10 0 - 50 U/L    Protein Total 6.3 (L) 6.4 - 8.3 g/dL    Albumin 3.9 3.5 - 5.2 g/dL    Bilirubin Total 0.2 <=1.2 mg/dL   Troponin T, High Sensitivity     Status: Abnormal   Result Value Ref Range    Troponin T, High Sensitivity 17 (H) <=14 ng/L   Nt probnp inpatient     Status: Normal   Result Value Ref Range    N terminal Pro BNP Inpatient 145 0 - 1,800 pg/mL   EKG 12-lead, tracing only     Status: None   Result Value Ref Range    Systolic Blood Pressure  mmHg    Diastolic Blood Pressure   mmHg    Ventricular Rate 68 BPM    Atrial Rate  BPM    MT Interval  ms    QRS Duration 104 ms     ms    QTc 446 ms    P Axis  degrees    R AXIS 36 degrees    T Axis 57 degrees    Interpretation ECG       Atrial fibrillation  Abnormal ECG  Unconfirmed report - interpretation of this ECG is computer generated - see medical record for final interpretation  Confirmed by - EMERGENCY ROOM, PHYSICIAN ([id number]),  DELVIN JOHNSON ([id number]) on 2/6/2025 10:19:00 PM     CBC with platelets differential     Status: Abnormal    Narrative    The following orders were created for panel order CBC with platelets differential.  Procedure                               Abnormality         Status                     ---------                               -----------         ------                     CBC with platelets and d...[[id number]]  Abnormal            Final result                 Please view results for these tests on the individual orders.     Medications - No data to display  Labs Ordered and Resulted from Time of ED Arrival to Time of ED Departure   TROPONIN T, HIGH SENSITIVITY - Abnormal       Result Value    Troponin T, High Sensitivity 18 (*)    CBC WITH PLATELETS AND DIFFERENTIAL - Abnormal    WBC Count 4.9      RBC Count 3.87      Hemoglobin 11.7      Hematocrit 36.0      MCV 93      MCH 30.2      MCHC 32.5      RDW 15.9 (*)     Platelet Count 282      % Neutrophils 55      % Lymphocytes 33      % Monocytes 11      % Eosinophils 1      % Basophils 0      % Immature Granulocytes 0      NRBCs per 100 WBC 0      Absolute Neutrophils 2.7      Absolute Lymphocytes 1.6      Absolute Monocytes 0.5      Absolute Eosinophils 0.1      Absolute Basophils 0.0      Absolute Immature Granulocytes 0.0      Absolute NRBCs 0.0     COMPREHENSIVE METABOLIC PANEL - Abnormal    Sodium 137      Potassium 4.1      Carbon Dioxide (CO2) 25      Anion Gap 10      Urea Nitrogen 18.0      Creatinine 0.69      GFR Estimate 87      Calcium 10.2       Chloride 102      Glucose 95      Alkaline Phosphatase 56      AST 19      ALT 10      Protein Total 6.3 (*)     Albumin 3.9      Bilirubin Total 0.2     TROPONIN T, HIGH SENSITIVITY - Abnormal    Troponin T, High Sensitivity 17 (*)    COMPREHENSIVE METABOLIC PANEL - Normal    Sodium 137      Potassium 4.3      Carbon Dioxide (CO2) 25      Anion Gap 10      Urea Nitrogen 20.0      Creatinine 0.74      GFR Estimate 81      Calcium 10.0      Chloride 102      Glucose 84      Alkaline Phosphatase 55      AST 23      ALT 11      Protein Total 6.5      Albumin 3.9      Bilirubin Total 0.3     NT PROBNP INPATIENT - Normal    N terminal Pro BNP Inpatient 140     NT PROBNP INPATIENT - Normal    N terminal Pro BNP Inpatient 145     TROPONIN T, HIGH SENSITIVITY     CT Head w/o Contrast   Final Result   Impression: No acute intracranial pathology. Moderate presumed chronic   small vessel ischemic disease and cerebral volume loss.      VERONICA JOHNS MD            SYSTEM ID:  [id number]                EKG Interpretation:      Interpreted by Jose Elias Simpson PA-C  Symptoms at time of EKG: none   Rhythm: Normal sinus  and Atrial fibrillation - controlled  Rate: Normal, 68 bpm  Axis: Normal  Ectopy: None  Conduction: Normal  ST Segments/ T Waves: No ST-T wave changes and No acute ischemic changes  Q Waves: None  Comparison to prior: EKG from today showed atrial fibrillation, rate well-controlled.  Previous EKG completed on January 20, 2020 showed normal sinus rhythm.    Clinical Impression: atrial fibrillation (chronic)     Critical care was not performed.     Medical Decision Making  The patient's presentation was of moderate complexity (a chronic illness mild to moderate exacerbation, progression, or side effect of treatment).    The patient's evaluation involved:  ordering and/or review of 3+ test(s) in this encounter (see separate area of note for details)    The patient's management necessitated moderate risk  (consideration of medication adjustment and management for hypertension, evaluation for intracranial abnormality given reports of occasional double vision).    Assessment & Plan    The patient is an 79 year old female with a history of atrial fibrillation since on Xarelto), CAD, HTN (on losartan 50 mg nightly), rheumatoid arthritis, Alzheimer's disease who presents to the ED for evaluation of hypertension.  States that her blood pressure has been elevated with systolic values from 130s to 150s, found to be 160 earlier this afternoon.    Denies any symptoms such as chest pain, shortness of breath, diaphoresis, acute vision changes or severe headaches.  Does have a mild headache today.  Given her report of acute intermittent double vision at a distance, CT scan today, no acute intracranial abnormality noted.    EKG showed atrial fibrillation today, troponin slightly elevated at 17, on recheck was 18.  Thought to be due to arrhythmia.  She reported mild swelling in the lower extremities and upper extremities, BNP normal today, low concern for CHF.  CBC and CMP grossly normal today, no electrolyte derangements, normal kidney function.    Recommend continuing your current dose losartan as prescribed given your blood pressure was within normal limits on presentation to the emergency department.  Continue to monitor blood pressure daily.  Follow-up with primary care for further evaluation and management if blood pressure continues to be 140/90 or more.  Recommend more prompt follow-up with blood pressure is 160/100 or greater.    See, evaluation in the emergency department if you develop worsening lightheadedness or dizziness, acute vision changes or headaches, chest pain or shortness of breath, acutely worsening fatigue or palpitations, or neurologic deficits.    I have reviewed the nursing notes. I have reviewed the findings, diagnosis, plan and need for follow up with the patient.    New Prescriptions    No medications  on file       Final diagnoses:   Hypertension       Jose Elias Simpson PA-C    [facility] EMERGENCY DEPARTMENT  2/6/2025     Jose Elias Simpson PA-C  02/06/25 [id number]    --    ED Attending Physician Attestation    I Wolf Jalloh MD, cared for this patient with the Advanced Practice Provider ([facility]SS[facility]). I personally provided a substantive portion of the care for this patient, including approving the care plan for the number and complexity of problems addressed and taking responsibility related to the risk of complications and/or morbidity or mortality of patient management. Please see the LYSSA's documentation for full details.    Summary of HPI, PE, ED Course   Patient is a 79 year old female evaluated in the emergency department for hypertension. Exam and ED course notable for reassuring exam.  EKG does show A-fib with a controlled rate.  She is chronically in A-fib and is anticoagulated.  Serial troponins are unchanged and unremarkable.  She is largely asymptomatic and she reports no symptoms during or prior after her elevated blood pressure.  Discussed need for follow-up with her primary care provider.  Patient is reassured and will return if she is any further concerns..  Vitals here are normal. After the completion of care in the emergency department, the patient was discharged.      Wolf Jalloh MD  Emergency Medicine        Wolf Jalloh MD  02/06/25 [id number]

## 2025-02-06 NOTE — ED TRIAGE NOTES
PT arrives c/o uncontrolled BP ongoing for a few days. Pt states they have been in the 160' systolic. Denies HA or blurry vision.      Triage Assessment (Adult)       Row Name 02/06/25 1420          Triage Assessment    Airway WDL WDL        Respiratory WDL    Respiratory WDL WDL        Skin Circulation/Temperature WDL    Skin Circulation/Temperature WDL WDL        Cardiac WDL    Cardiac WDL WDL        Peripheral/Neurovascular WDL    Peripheral Neurovascular WDL WDL        Cognitive/Neuro/Behavioral WDL    Cognitive/Neuro/Behavioral WDL WDL

## 2025-02-07 NOTE — DISCHARGE INSTRUCTIONS
You were evaluated today due to concerns for elevated blood pressure.  Recommend continuing your current dose losartan as prescribed given your blood pressure was within normal limits on presentation to the emergency department.  Continue to monitor blood pressure daily.  Follow-up with primary care for further evaluation and management if blood pressure continues to be 140/90 or more.  Recommend more prompt follow-up with blood pressure is 160/100 or greater.    Seek evaluation in the emergency department if you develop worsening lightheadedness or dizziness, acute vision changes or headaches, chest pain or shortness of breath, acutely worsening fatigue or palpitations, or neurologic deficits.

## 2025-02-11 ENCOUNTER — ASSISTED LIVING VISIT (OUTPATIENT)
Dept: GERIATRICS | Facility: CLINIC | Age: 80
End: 2025-02-11
Payer: MEDICARE

## 2025-02-11 VITALS
DIASTOLIC BLOOD PRESSURE: 80 MMHG | HEART RATE: 67 BPM | RESPIRATION RATE: 16 BRPM | BODY MASS INDEX: 29.86 KG/M2 | SYSTOLIC BLOOD PRESSURE: 137 MMHG | WEIGHT: 185 LBS | TEMPERATURE: 97.6 F

## 2025-02-11 DIAGNOSIS — M54.2 NECK PAIN: Primary | ICD-10-CM

## 2025-02-11 DIAGNOSIS — M62.838 MUSCLE SPASM: ICD-10-CM

## 2025-02-11 DIAGNOSIS — G30.9 ALZHEIMER'S DISEASE (H): ICD-10-CM

## 2025-02-11 DIAGNOSIS — I10 PRIMARY HYPERTENSION: ICD-10-CM

## 2025-02-11 DIAGNOSIS — F02.80 ALZHEIMER'S DISEASE (H): ICD-10-CM

## 2025-02-11 DIAGNOSIS — F33.0 MAJOR DEPRESSIVE DISORDER, RECURRENT EPISODE, MILD: ICD-10-CM

## 2025-02-11 PROCEDURE — 99349 HOME/RES VST EST MOD MDM 40: CPT | Performed by: NURSE PRACTITIONER

## 2025-02-11 RX ORDER — METHOCARBAMOL 500 MG/1
500 TABLET, FILM COATED ORAL 4 TIMES DAILY PRN
Qty: 30 TABLET | Refills: 3 | Status: SHIPPED | OUTPATIENT
Start: 2025-02-11

## 2025-02-11 RX ORDER — LOSARTAN POTASSIUM 50 MG/1
75 TABLET ORAL DAILY
Qty: 45 TABLET | Refills: 11 | Status: SHIPPED | OUTPATIENT
Start: 2025-02-11

## 2025-02-11 NOTE — LETTER
2/11/2025      Heydi Barriga  [address]  [place] [address]        No notes on file      Sincerely,        MYKE Colón CNP    Electronically signed

## 2025-02-11 NOTE — PROGRESS NOTES
[facility] GERIATRICS    Chief Complaint   Patient presents with    RECHECK     HPI:  Heydi Barriga is a 79 year old  (1945), who is being seen today for an episodic care visit at: [facility]) [[id number]]. Today's concern is: ***    Allergies, and PMH/PSH reviewed in EPIC today.  REVIEW OF SYSTEMS:  {zpnrrv60:205342}    Objective:   /80   Pulse 67   Temp 97.6  F (36.4  C)   Resp 16   Wt 83.9 kg (185 lb)   BMI 29.86 kg/m    {Nursing home physical exam :527276}    {fgslab:258883}    Assessment/Plan:  {FGS DX2:503366}    MED REC REQUIRED{TIP  Click the link below to document or use med rec list, use list to pull in response :889843}  Post Medication Reconciliation Status: {MED REC LIST:102325}      Orders:  {fgsorders:917644}  ***    Electronically signed by: Chante Brown ***         SpO2 98%   BMI 29.86 kg/m    GENERAL APPEARANCE:  Alert, in no distress  ENT:  [place]   EYES:  conjunctiva and lids normal  RESP:  lungs clear to auscultation   CV:   irregular rhythm, rate normal, no murmur, no LE edema   ABDOMEN:  soft, non-tender, no distension  M/S:   gait steady with walker. ROM of neck limited by pain. CAMARILLO with good strength. Gait steady with walker.   SKIN:  no visible rashes or open areas  PSYCH:  oriented X 3, memory impaired, word finding difficulty, slightly anxious     Recent labs in [facility] reviewed by me today.     PET brain 6/19/2024:  Impression:   Positive scan with pronounced amyloid deposition in the bilateral  cerebral hemispheres. BAPL: 3.        ASSESSMENT / PLAN:  (M54.2) Neck pain  (primary encounter diagnosis)  (M62.838) Muscle spasm  Comment: several days of muscle spasms and pain. No fall or injury.   Plan: diclofenac gel qid, methocarbamol 500 mg qid prn. Continue tylenol. Asked staff to help her schedule and appointment with the onsite massage therapist.   Addendum: methocarbamol not covered by insurance. Tizanidine prn ordered per insurance coverage.     (I10) Primary hypertension  Comment: /86 with HR 68 today. Other recent readings: 160/100, 1446/80  Plan: increase losartan to 75 mg daily. Monitor BP and adjust dose as indicated     (G30.9,  F02.80) Alzheimer's disease (H)  Comment: mild deficits, poor short term memory   Plan: continue Leqembi per Dr Butler. 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 She's slightly anxious today, pain and poor sleep are likely contributing   Plan: continue Wellbutrin, Pristiq, Strattera. Psych follow up per usual routine.           Electronically signed by: MYKE Colón CNP

## 2025-02-12 RX ORDER — TIZANIDINE 2 MG/1
2 TABLET ORAL 2 TIMES DAILY PRN
Qty: 30 TABLET | Refills: 2 | Status: SHIPPED | OUTPATIENT
Start: 2025-02-12

## 2025-02-12 NOTE — PROGRESS NOTES
Heydi Barriga   1945    Orders 2025:  DISCONTINUE methocarbamol-not covered by insurance  Tizanidine 2 mg po bid prn neck pain/muscle spasms  Sent to pharmacy  Discussed with patient    MYKE Colón CNP on 2025 at 3:20 PM

## 2025-02-17 ENCOUNTER — TELEPHONE (OUTPATIENT)
Dept: GERIATRICS | Facility: CLINIC | Age: 80
End: 2025-02-17
Payer: MEDICARE

## 2025-02-18 NOTE — TELEPHONE ENCOUNTER
Prior Authorization Retail Medication Request    Medication/Dose: baricitinib (OLUMIANT) 2 MG tablet  Diagnosis and ICD code (if different than what is on RX):  Rheumatoid arthritis involving multiple sites, unspecified whether rheumatoid factor present (H) [M06.9]   New/renewal/insurance change PA/secondary ins. PA:  Previously Tried and Failed:    Rationale:      Insurance   Primary: MEDICARE  Insurance ID:  [id number]      Secondary (if applicable):[place] [place]   Insurance ID:  [id number]      Pharmacy Information (if different than what is on RX)  Name:    Phone:    Fax:    Clinic Information  Preferred routing pool for dept communication:

## 2025-02-20 NOTE — TELEPHONE ENCOUNTER
Central Prior Authorization Team   Phone: [phone]    PA Initiation    Medication: baricitinib (OLUMIANT) 2 MG tablet  Insurance Company: [employer] Part D - Phone [phone] Fax [phone]  Pharmacy Filling the Rx: [facility] - [facility] [address] [place] [address]  Filling Pharmacy Phone: [phone]  Filling Pharmacy Fax:    Start Date: 2/20/2025    [facility] KEY: [id number]

## 2025-02-20 NOTE — TELEPHONE ENCOUNTER
Prior Authorization Approval    Authorization Effective Date: 1/1/2025  Authorization Expiration Date: 2/20/2026  Medication: baricitinib (OLUMIANT) 2 MG tablet  Reference #:     Insurance Company: [employer] Part D - Phone [phone] Fax [phone]  Which Pharmacy is filling the prescription (Not needed for infusion/clinic administered): [facility] - [address]  Pharmacy Notified: Yes  Patient Notified: Instructed pharmacy to notify patient when script is ready to /ship.

## 2025-03-08 VITALS
RESPIRATION RATE: 18 BRPM | SYSTOLIC BLOOD PRESSURE: 142 MMHG | WEIGHT: 185 LBS | HEART RATE: 68 BPM | OXYGEN SATURATION: 98 % | BODY MASS INDEX: 29.86 KG/M2 | DIASTOLIC BLOOD PRESSURE: 86 MMHG

## 2025-03-11 ENCOUNTER — ASSISTED LIVING VISIT (OUTPATIENT)
Dept: GERIATRICS | Facility: CLINIC | Age: 80
End: 2025-03-11
Payer: MEDICARE

## 2025-03-11 VITALS
TEMPERATURE: 97.6 F | DIASTOLIC BLOOD PRESSURE: 80 MMHG | WEIGHT: 185 LBS | RESPIRATION RATE: 16 BRPM | BODY MASS INDEX: 29.86 KG/M2 | HEART RATE: 67 BPM | SYSTOLIC BLOOD PRESSURE: 137 MMHG

## 2025-03-11 DIAGNOSIS — F02.80 ALZHEIMER'S DISEASE (H): Primary | ICD-10-CM

## 2025-03-11 DIAGNOSIS — I10 PRIMARY HYPERTENSION: ICD-10-CM

## 2025-03-11 DIAGNOSIS — M06.9 RHEUMATOID ARTHRITIS INVOLVING MULTIPLE SITES, UNSPECIFIED WHETHER RHEUMATOID FACTOR PRESENT (H): ICD-10-CM

## 2025-03-11 DIAGNOSIS — I25.10 CORONARY ARTERY DISEASE INVOLVING NATIVE CORONARY ARTERY OF NATIVE HEART WITHOUT ANGINA PECTORIS: ICD-10-CM

## 2025-03-11 DIAGNOSIS — I48.20 CHRONIC A-FIB (H): ICD-10-CM

## 2025-03-11 DIAGNOSIS — I10 PRIMARY HYPERTENSION: Primary | ICD-10-CM

## 2025-03-11 DIAGNOSIS — G30.9 ALZHEIMER'S DISEASE (H): Primary | ICD-10-CM

## 2025-03-11 DIAGNOSIS — F33.0 MAJOR DEPRESSIVE DISORDER, RECURRENT EPISODE, MILD: ICD-10-CM

## 2025-03-11 DIAGNOSIS — F98.8 ATTENTION DEFICIT DISORDER, UNSPECIFIED TYPE: ICD-10-CM

## 2025-03-11 PROCEDURE — 99349 HOME/RES VST EST MOD MDM 40: CPT | Performed by: NURSE PRACTITIONER

## 2025-03-11 RX ORDER — LOSARTAN POTASSIUM 100 MG/1
100 TABLET ORAL DAILY
Qty: 30 TABLET | Refills: 11 | Status: SHIPPED | OUTPATIENT
Start: 2025-03-11

## 2025-03-11 NOTE — PROGRESS NOTES
[facility] GERIATRICS    Chief Complaint   Patient presents with    RECHECK     HPI:  Heydi Barriga is a 80 year old  (1945), who is being seen today for an episodic care visit at: [facility]) [[id number]]. Today's concern is: ***    Allergies, and PMH/PSH reviewed in EPIC today.  REVIEW OF SYSTEMS:  {tajksc22:069371}    Objective:   /80   Pulse 67   Temp 97.6  F (36.4  C)   Resp 16   Wt 83.9 kg (185 lb)   BMI 29.86 kg/m    {Nursing home physical exam :287104}    {fgslab:912412}    Assessment/Plan:  {FGS DX2:012874}    MED REC REQUIRED{TIP  Click the link below to document or use med rec list, use list to pull in response :544635}  Post Medication Reconciliation Status: {MED REC LIST:952938}      Orders:  {fgsorders:435294}  ***    Electronically signed by: Chante Brown ***       "know when I have a UTI.\"' Ambulates with a walker. No falls. Remains independent with cares. Meds are administered by staff.       Allergies, and PMH/PSH reviewed in EPIC today    REVIEW OF SYSTEMS:  4 point ROS including Respiratory, CV, GI and , other than that noted in the HPI,  is negative    Objective:   BP (!) 152/86   Pulse 76   Resp 18   Wt 83.9 kg (185 lb)   SpO2 98%   BMI 29.86 kg/m    GENERAL APPEARANCE:  Alert, in no distress  ENT:  [place]   EYES:  conjunctiva and lids normal  RESP:  lungs clear to auscultation   CV:   irregular rhythm, rate normal, no murmur, no LE edema   ABDOMEN:  soft, non-tender, no distension  M/S:   gait steady with walker. CAMARILLO with good strength. No acute joint inflammation   SKIN:  no visible rashes or open areas  PSYCH:  oriented X 3, memory impaired, word finding difficulty, affect normal     Recent labs in [facility] reviewed by me today.     PET brain 6/19/2024:  Impression:   Positive scan with pronounced amyloid deposition in the bilateral  cerebral hemispheres. BAPL: 3.      ASSESSMENT / PLAN:  (G30.9,  F02.80) Alzheimer's disease (H)  (primary encounter diagnosis)  Comment: progressive disease. She's grieving the many losses associated with cognitive decline. She decided to stop Leqembi and plans to change neurologists.   Plan: consider starting donepezil or rivastigmine. Will hold off on med changes pending MTM visit-see below.    (I10) Primary hypertension  Comment: losartan increased last month. /86 today with HR 76  Plan: increase losartan to 100 mg daily.     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 She is on a complicated medication regimen, and has been on current psych meds \"for years.\" Suspect that some of her anxiety may be a side effect of meds. She is agreeable to meeting with Jo Ann Daley PharmD  for " a medication review and recommendations.   Plan: continue Strattera for ADD. Continue Pristiq, Wellbutrin. Follow up with her therapist as planned. Refer to Jo Ann Daley PharmD for MTM visit.   Discussed with staff.     (I48.20) Chronic a-fib (H)  Comment: rate controlled.   Followed by [facility]. Multiple failed cardioversions, most recent was in 2018. Sotalol dc'd 2022.   Plan: continue Xarelto.     (I25.10) Coronary artery disease involving native coronary artery of native heart without angina pectoris  Comment: no acute symptoms  Nonobstructive disease on CTA 3/2016. Nuclear stress test 7/2018: small anteroseptal fixed defect, probable artifact    Plan: continue statin. Increase losartan as above. Cardiology follow up per usual schedule.     (M06.9) Rheumatoid arthritis involving multiple sites, unspecified whether rheumatoid factor present (H)  Comment: no s/s of acute flare   Received Prolia 1/16/2025  Plan: continue plaquenil, olumiant, prednisone prn severe flares. Continue calcium, vitamin D. Follow up with Rheumatology as scheduled.             Electronically signed by: MYKE Wolfe CNP

## 2025-03-11 NOTE — LETTER
3/11/2025      Heydi Barriga  [address]  [place] [address]        No notes on file      Sincerely,        MYKE Colón CNP    Electronically signed

## 2025-03-19 ENCOUNTER — TELEPHONE (OUTPATIENT)
Dept: GERIATRICS | Facility: CLINIC | Age: 80
End: 2025-03-19
Payer: MEDICARE

## 2025-03-19 NOTE — TELEPHONE ENCOUNTER
Jo Ann Daley PharmD and I are meeting with patient on 4/3/2025 at 10:30 am  review meds and discuss options.   Please update her care manager.       MYKE Colón CNP on 3/19/2025 at 12:05 PM

## 2025-03-19 NOTE — TELEPHONE ENCOUNTER
"[facility] Triage Call    Provider: MYKE Murcia CNP   Facility: [facility]  Facility Type:  AL    Caller: Amalia (nurse care manager- [facility])  Call Back Number: [phone]    Allergies:    Allergies   Allergen Reactions    Pregabalin Other (See Comments)     Post nasal drip    Atorvastatin Cough     Post nasal drip    Gabapentin Other (See Comments)     Felt dissasociated    Lisinopril     Oxycodone Other (See Comments)     Agitated, and crying    Ramipril     Walnuts [Nuts] Other (See Comments)     Scratchy  throat        SBAR:     S-(situation): The patient his having a harder time with anxiety/depression and some paranoia with her Alzheimer's. Nursing is wanting to know if there is anything that can be prescribed or another referral to another psych group as the patient is having a hard time with the current provider she has.      B-(background): Pt did see a [facility] DrLuke and the patient is not wanting to see him any further as she feels he is very dismissive.     A-(assessment): more paranoia/anxiety /depression - high functioning with her Alzhiemer's and self awareness.     R-(recommendations): Amalia the nurse care manager was requesting a phone call with the provider to discuss on going are. ([phone])      Telephone encounter sent to:  MYKE Murcia CNP     Please send response/orders to \"Geriatrics Nurse Pool\"    America Cabrera RN      "

## 2025-03-20 ENCOUNTER — TELEPHONE (OUTPATIENT)
Dept: GERIATRICS | Facility: CLINIC | Age: 80
End: 2025-03-20
Payer: MEDICARE

## 2025-03-20 DIAGNOSIS — R09.81 NASAL CONGESTION: Primary | ICD-10-CM

## 2025-03-20 RX ORDER — GUAIFENESIN 600 MG/1
600 TABLET, EXTENDED RELEASE ORAL 2 TIMES DAILY
Qty: 14 TABLET | Refills: 0 | Status: SHIPPED | OUTPATIENT
Start: 2025-03-20 | End: 2025-03-27

## 2025-03-20 NOTE — TELEPHONE ENCOUNTER
Heydi Barriga   1945    Orders 3/20/2025:   Mucinex 600 mg po bid X 7 days for congestion  Reswab for COVID 3/21/2025       MYKE Colón CNP on 3/20/2025 at 3:48 PM

## 2025-03-26 ENCOUNTER — TELEPHONE (OUTPATIENT)
Dept: GERIATRICS | Facility: CLINIC | Age: 80
End: 2025-03-26
Payer: MEDICARE

## 2025-03-26 NOTE — TELEPHONE ENCOUNTER
AL nurse called reporting patient tested positive for COVID-19 today. Symptoms started 3/20/2025 with cough, congestion and fatigue. COVID test was negative that day. Mucinex was ordered. Afebrile. No hypoxia. She is out of the 5 day window for antiviral treatment.   Will follow up during rounds tomorrow.   Discussed with her POA Kailyn Cormier.       MYKE Colón CNP on 3/26/2025 at 3:42 PM

## 2025-03-27 ENCOUNTER — ASSISTED LIVING VISIT (OUTPATIENT)
Dept: GERIATRICS | Facility: CLINIC | Age: 80
End: 2025-03-27
Payer: MEDICARE

## 2025-03-27 VITALS
DIASTOLIC BLOOD PRESSURE: 97 MMHG | BODY MASS INDEX: 32.28 KG/M2 | RESPIRATION RATE: 16 BRPM | SYSTOLIC BLOOD PRESSURE: 148 MMHG | TEMPERATURE: 97.8 F | HEART RATE: 80 BPM | WEIGHT: 200 LBS

## 2025-03-27 DIAGNOSIS — J44.9 CHRONIC OBSTRUCTIVE PULMONARY DISEASE WITHOUT EXACERBATION (H): ICD-10-CM

## 2025-03-27 DIAGNOSIS — U07.1 INFECTION DUE TO 2019 NOVEL CORONAVIRUS: Primary | ICD-10-CM

## 2025-03-27 PROCEDURE — 99349 HOME/RES VST EST MOD MDM 40: CPT | Performed by: NURSE PRACTITIONER

## 2025-03-27 NOTE — LETTER
3/27/2025      Heydi Barriga  [address]  [place] [address]        No notes on file      Sincerely,        MYKE Colón CNP    Electronically signed

## 2025-03-27 NOTE — PROGRESS NOTES
[facility] GERIATRICS    Chief Complaint   Patient presents with    RECHECK     HPI:  Heydi Barriga is a 80 year old  (1945), who is being seen today for an episodic care visit at: [facility]) [[id number]]. Today's concern is: ***    Allergies, and PMH/PSH reviewed in EPIC today.  REVIEW OF SYSTEMS:  {pmnzqz62:392591}    Objective:   BP (!) 148/97   Pulse 80   Temp 97.8  F (36.6  C)   Resp 16   Wt 90.7 kg (200 lb)   BMI 32.28 kg/m    {Nursing home physical exam :126301}    {fgslab:762044}    Assessment/Plan:  {FGS DX2:528816}    MED REC REQUIRED{TIP  Click the link below to document or use med rec list, use list to pull in response :113883}  Post Medication Reconciliation Status: {MED REC LIST:935197}      Orders:  {fgsorders:485223}  ***    Electronically signed by: Chante Brown ***       walker. CAMARILLO with good strength   SKIN:  no rashes or open areas  PSYCH:  oriented X 3, memory impaired, word finding difficulty, affect normal     Recent labs in [facility] reviewed by me today.         ASSESSMENT / PLAN:  (U07.1) Infection due to 2019 novel coronavirus  (primary encounter diagnosis)  Comment: worsening productive cough for green sputum, concerning for pneumonia. Other symptoms slowly improving   Plan: CXR. Continue mucinex bid. Monitor temp, O2 sats, symptoms.   Discussed with staff     (J44.9) Chronic obstructive pulmonary disease without exacerbation (H)  Comment: no wheezing or signs of exacerbation   Plan: CXR as above         Electronically signed by: MYKE Colón CNP

## 2025-04-01 RX ORDER — MULTIVITAMIN
1 TABLET ORAL DAILY
COMMUNITY

## 2025-04-01 RX ORDER — GUAIFENESIN 600 MG/1
1200 TABLET, EXTENDED RELEASE ORAL 2 TIMES DAILY
COMMUNITY
End: 2025-04-03

## 2025-04-01 RX ORDER — LIDOCAINE 4 G/G
1 PATCH TOPICAL EVERY 24 HOURS
COMMUNITY

## 2025-04-03 ENCOUNTER — OFFICE VISIT (OUTPATIENT)
Dept: PHARMACY | Facility: CLINIC | Age: 80
End: 2025-04-03

## 2025-04-03 ENCOUNTER — ASSISTED LIVING VISIT (OUTPATIENT)
Dept: GERIATRICS | Facility: CLINIC | Age: 80
End: 2025-04-03
Payer: MEDICARE

## 2025-04-03 VITALS
BODY MASS INDEX: 32.28 KG/M2 | SYSTOLIC BLOOD PRESSURE: 148 MMHG | WEIGHT: 200 LBS | RESPIRATION RATE: 16 BRPM | HEART RATE: 80 BPM | DIASTOLIC BLOOD PRESSURE: 97 MMHG | TEMPERATURE: 97.6 F

## 2025-04-03 DIAGNOSIS — G30.9 ALZHEIMER'S DISEASE (H): Primary | ICD-10-CM

## 2025-04-03 DIAGNOSIS — N93.9 VAGINAL BLEEDING: ICD-10-CM

## 2025-04-03 DIAGNOSIS — J44.9 CHRONIC OBSTRUCTIVE PULMONARY DISEASE WITHOUT EXACERBATION (H): Primary | ICD-10-CM

## 2025-04-03 DIAGNOSIS — I10 PRIMARY HYPERTENSION: ICD-10-CM

## 2025-04-03 DIAGNOSIS — R42 DIZZINESS: ICD-10-CM

## 2025-04-03 DIAGNOSIS — R52 PAIN: ICD-10-CM

## 2025-04-03 DIAGNOSIS — G30.9 ALZHEIMER'S DISEASE (H): ICD-10-CM

## 2025-04-03 DIAGNOSIS — J44.9 CHRONIC OBSTRUCTIVE PULMONARY DISEASE WITHOUT EXACERBATION (H): ICD-10-CM

## 2025-04-03 DIAGNOSIS — F33.0 MAJOR DEPRESSIVE DISORDER, RECURRENT EPISODE, MILD: ICD-10-CM

## 2025-04-03 DIAGNOSIS — M54.2 NECK PAIN: ICD-10-CM

## 2025-04-03 DIAGNOSIS — M06.9 RHEUMATOID ARTHRITIS INVOLVING MULTIPLE SITES, UNSPECIFIED WHETHER RHEUMATOID FACTOR PRESENT (H): ICD-10-CM

## 2025-04-03 DIAGNOSIS — M81.0 AGE-RELATED OSTEOPOROSIS WITHOUT CURRENT PATHOLOGICAL FRACTURE: ICD-10-CM

## 2025-04-03 DIAGNOSIS — K21.00 GASTROESOPHAGEAL REFLUX DISEASE WITH ESOPHAGITIS WITHOUT HEMORRHAGE: ICD-10-CM

## 2025-04-03 DIAGNOSIS — E21.0 PRIMARY HYPERPARATHYROIDISM: ICD-10-CM

## 2025-04-03 DIAGNOSIS — M62.838 MUSCLE SPASM: ICD-10-CM

## 2025-04-03 DIAGNOSIS — F02.80 ALZHEIMER'S DISEASE (H): Primary | ICD-10-CM

## 2025-04-03 DIAGNOSIS — K21.9 GASTROESOPHAGEAL REFLUX DISEASE, UNSPECIFIED WHETHER ESOPHAGITIS PRESENT: ICD-10-CM

## 2025-04-03 DIAGNOSIS — I48.0 PAROXYSMAL ATRIAL FIBRILLATION (H): ICD-10-CM

## 2025-04-03 DIAGNOSIS — E78.5 HYPERLIPIDEMIA LDL GOAL <70: ICD-10-CM

## 2025-04-03 DIAGNOSIS — Z78.9 TAKES DIETARY SUPPLEMENTS: ICD-10-CM

## 2025-04-03 DIAGNOSIS — I25.10 CORONARY ARTERY DISEASE INVOLVING NATIVE CORONARY ARTERY OF NATIVE HEART WITHOUT ANGINA PECTORIS: ICD-10-CM

## 2025-04-03 DIAGNOSIS — F02.80 ALZHEIMER'S DISEASE (H): ICD-10-CM

## 2025-04-03 DIAGNOSIS — F90.9 ATTENTION DEFICIT HYPERACTIVITY DISORDER (ADHD), UNSPECIFIED ADHD TYPE: ICD-10-CM

## 2025-04-03 DIAGNOSIS — I48.20 CHRONIC A-FIB (H): ICD-10-CM

## 2025-04-03 DIAGNOSIS — F98.8 ATTENTION DEFICIT DISORDER, UNSPECIFIED TYPE: ICD-10-CM

## 2025-04-03 PROCEDURE — 99207 PR NO CHARGE LOS: CPT | Performed by: PHARMACIST

## 2025-04-03 PROCEDURE — 99350 HOME/RES VST EST HIGH MDM 60: CPT | Performed by: NURSE PRACTITIONER

## 2025-04-03 RX ORDER — DONEPEZIL HYDROCHLORIDE 5 MG/1
5 TABLET, FILM COATED ORAL AT BEDTIME
Qty: 30 TABLET | Refills: 11 | Status: SHIPPED | OUTPATIENT
Start: 2025-04-03

## 2025-04-03 RX ORDER — ATOMOXETINE 25 MG/1
25 CAPSULE ORAL DAILY
Qty: 30 CAPSULE | Refills: 11 | Status: SHIPPED | OUTPATIENT
Start: 2025-04-03

## 2025-04-03 RX ORDER — GUAIFENESIN 600 MG/1
600 TABLET, EXTENDED RELEASE ORAL 2 TIMES DAILY
Qty: 60 TABLET | Refills: 11 | Status: SHIPPED | OUTPATIENT
Start: 2025-04-03

## 2025-04-03 RX ORDER — OMEPRAZOLE 20 MG/1
20 TABLET, DELAYED RELEASE ORAL DAILY
Qty: 30 TABLET | Refills: 11 | Status: SHIPPED | OUTPATIENT
Start: 2025-04-03

## 2025-04-03 NOTE — PROGRESS NOTES
Medication Therapy Management (MTM) Encounter    ASSESSMENT:                            Medication Adherence/Access: {adherencechoices:852108}    ***:   ***    PLAN:                            ***monitor for symptoms suggestive of ARIA (eg, headache, altered mental status, visual changes, dizziness, nausea, gait difficulty, focal neurologic deficits, seizure).    Follow-up: {followuptest2:100928}    SUBJECTIVE/OBJECTIVE:                          Heydi Barriga is a 80 year old female seen for an initial visit. She was referred to me from Pablo Prabhakar NP. Today's visit is a co-visit with Pablo.     Reason for visit: comprehensive med reveiw.    Allergies/ADRs: Reviewed in chart  Past Medical History: Reviewed in chart  Tobacco: She reports that she has quit smoking. She has never used smokeless tobacco.  Alcohol: {ALCOHOL CONSUMPTION HX:093271}  Assistive Devices: walker  Recent Falls: ***  Specialists: neurology, [facility], [facility], [facility], Endocrine2,  Urology, psychiatry    Medication Adherence/Access: Patient has assistance with medication administration: assisted living.    COPD   Symbicort 80-4.5 - 2 puffs twice daily  Albuterol inhaler - 2 puffs every 4hr as needed  Mucinex 600mg - 2 tabs twice daily    {Steroid inhaler -- Delete if patient does not use steroid:647111}    Patient reports {:190224}.    Patient reports the following symptoms: {COPD SYMPTOMS:882904}.  {COPDoptionsMTM:249498}     Mental Health   Alzheimers Disease - on Leqembi (started 9/2024)  Depression, ADHD  Strattera 40mg daily  Bupropion XL 300mg daily (noon)  Pristiq 100mg at bedtime    Angry, anxious, mood is poor as dementia progressing per NP.    Patient reports { :743102}.  {mtmdepassess:892508}         Hypertension , CAD, Afib  Losartan 100mg daily  Xarelto 20mg daily    Patient reports {side effects:165237}  Patient {HTNDoes/doesnot:987918}.       Hyperlipidemia   rosuvastatin 10mg  daily  Patient reports {mtmlipidsideeffect:736336}  {lipidascvd:057560}     GERD    Famotidine 20 mg twice daily   {gerd2:026390}         Supplements   Vitamin C 500mg daily  MVI daily  Probiotic - 1 capsule daily    ***  {supplement:679397}       Hyperparathyroidism  Sensipar 30mg twice daily  Calcium citrate 200mg twice daily  Vitamin D3 50mcg daily    Pain, Rheumatoid arthritis    Olumiant 2mg daily  Hydroxychloroquine 200mg twice daily  Acetaminophen 1000mg three times daily as needed  Diclofenac gel 2gm four times daily to neck  Lidocaine patch daily  Prednisone 5-25mg daily as needed  Tizanidine 2mg twice daily as needed    Pain type/location: ***  Pain is described as { :801352}.  Patient feels that current therapy {IS NOT/IS:864953} effective.   Patient reports the following side effects: {mtmpainse:902884}.    Dizziness  Meclizine 12.5mg twice daily as needed    Estradiol vaginal cream - 1gm vaginally every 4 days  ***  ***      Summary Lecanemab may enhance the adverse/toxic effect of Anticoagulants. Specifically, the risk of hemorrhage may be increased. Severity Major Reliability Rating Fair    Patient Management Avoid use of lecanemab in patients who are being treated with an anticoagulant when possible. If concurrent use is necessary, monitor closely for evidence of intracerebral hemorrhage or other bleeding.    Anticoagulants Interacting Members Acenocoumarol, Apixaban, Argatroban, Bemiparin, Bivalirudin, Certoparin, Dabigatran Etexilate, Dalteparin, Danaparoid, Desirudin, Edoxaban, Enoxaparin, Fondaparinux, Heparin, Nadroparin, Phenindione, Phenprocoumon, Rivaroxaban, Tinzaparin, Warfarin    Discussion Lecanemab prescribing information states intracerebral hemorrhages greater than 1 cm in diameter have been reported in patients treated lecanemab.2 However, lecanemab prescribing information also states patients treated with lecanemab and antithrombotic agents, such as thrombolytic agents, did not have  an increased risk of amyloid related imaging abnormalities (ARIA). The incidence of intracerebral hemorrhage was 0.9% (3/328) in those using antithrombotics versus 0.6% (3/545) in those who did not, and the incidence was 2.5% (2/79) in individuals receiving concurrent anticoagulants (with or without antiplatelet agents) no cases in those receiving placebo.2 However, a published review and commentary on lecanemab suggests that the incidence of symptomatic hemorrhage reported in the label and initial clinical studies is likely to be an underestimate based on the demographics of those in the studies vs those of likely users in clinical practice and based on experience early bleeding risk estimates with other drugs.3 Similarly, a single case report describes a patient who treated with 3 doses of lecanemab in a clinical trial and presented to the emergency department with an ischemic stroke.1 The patient did not have contraindications to thrombolysis and was treated with tissue plasminogen activator (t-PA); however, after receipt of the t-PA bolus and during t-PA infusion the patient developed extensive, multifocal intraparenchymal hemorrhages and later .1    Lecanemab prescribing information recommends using additional caution during coadministration with antithrombotics, such as anticoagulants, and in anyone at higher risk for intracerebral hemorrhage.2 In contrast to the label recommendation, published acceptable use recommendations for lecanemab developed by the Alzheimer's Disease and Related Disorders Therapeutics Workgroup recommend that patients on anticoagulants be excluded from treatment with lecanemab until more evidence becomes available regarding the safety of combined use.4    The mechanism of this interaction is likely additive risk of hemorrhage.      Clinical symptoms suggesting ARIA may include abnormal gait, confusion, dizziness, focal neurologic deficits, headache, nausea, visual disturbance, and  seizures. Focal neurologic deficits caused by ARIA-E can mimic an ischemic stroke. Resolution of ARIA (-E and/or -H) occurred within 4 months in the majority of cases in clinical trials (Ref).    CrCl cannot be calculated (Unknown ideal weight.).    ----------------  {SUZANNE?:238518}    I spent {[facility] total time 3:879001} with this patient today{MTMpartdbillingquestion:042396}. { :546542}.     A summary of these recommendations {GIVEN/NOT GIVEN:736783}.    ***         Medication Therapy Recommendations  No medication therapy recommendations to display      feel she needs it available.    Vaginal bleeding  Estradiol vaginal cream - 1gm vaginally every 4 days    Denies current issues or side effects.    Est CrCl ([place]-Gault) = 84ml/min (using actual body weight); 67ml/min (using adjusted body weight)      ----------------      I spent 80 minutes with this patient today.     A summary of these recommendations was mailed to the patient.    Jo Ann Daley, Pharm.D.,[facility]  Board Certified Geriatric Pharmacist  Medication Therapy Management Pharmacist  [phone]           Medication Therapy Recommendations  Alzheimer's disease (H)   1 Current Medication: donepezil (ARICEPT) 5 MG tablet   Current Medication Sig: Take 1 tablet (5 mg) by mouth at bedtime.   Rationale: Untreated condition - Needs additional medication therapy - Indication   Recommendation: Start Medication   Status: Accepted per Provider   Identified Date: 4/3/2025 Completed Date: 4/3/2025         Attention deficit hyperactivity disorder (ADHD), unspecified ADHD type   1 Current Medication: atomoxetine (STRATTERA) 40 MG capsule (Discontinued)   Current Medication Sig: Take 40 mg by mouth daily   Rationale: Undesirable effect - Adverse medication event - Safety   Recommendation: Decrease Dose   Status: Accepted per Provider   Identified Date: 4/3/2025 Completed Date: 4/3/2025         Dizziness   1 Current Medication: meclizine (ANTIVERT) 12.5 MG tablet   Current Medication Sig: Take 12.5 mg by mouth 2 times daily as needed for dizziness   Rationale: Undesirable effect - Adverse medication event - Safety   Recommendation: Discontinue Medication   Status: Contact Provider - Awaiting Response   Identified Date: 4/3/2025         GERD (gastroesophageal reflux disease)   1 Current Medication: famotidine (PEPCID) 20 MG tablet (Discontinued)   Current Medication Sig: TAKE 1 TABLET BY MOUTH TWICE DAILY   Rationale: More effective medication available - Ineffective medication - Effectiveness   Recommendation: Change  Medication   Status: Accepted per Provider   Identified Date: 4/3/2025 Completed Date: 4/7/2025         HTN (hypertension)   1 Current Medication: losartan (COZAAR) 100 MG tablet   Current Medication Sig: Take 1 tablet (100 mg) by mouth daily.   Rationale: Medication requires monitoring - Needs additional monitoring   Recommendation: Order Lab   Status: Contact Provider - Awaiting Response   Identified Date: 4/3/2025         Hyperlipidemia LDL goal <70   1 Current Medication: rosuvastatin (CRESTOR) 10 MG tablet   Current Medication Sig: TAKE 1 TABLET BY MOUTH ONCE DAILY   Rationale: Medication requires monitoring - Needs additional monitoring   Recommendation: Order Lab   Status: Contact Provider - Awaiting Response   Identified Date: 4/3/2025         Pain   1 Current Medication: tiZANidine (ZANAFLEX) 2 MG tablet   Current Medication Sig: Take 1 tablet (2 mg) by mouth 2 times daily as needed for muscle spasms.   Rationale: Undesirable effect - Adverse medication event - Safety   Recommendation: Discontinue Medication   Status: Contact Provider - Awaiting Response   Identified Date: 4/3/2025

## 2025-04-03 NOTE — Clinical Note
Arlene Shah!  Thanks again!  When I was documenting, thought of a couple other things - could we recheck a BMP (due to previous losartan increase) as well as lipid panel?  - see note:) - thank you!!!  Let me know if any questions!!

## 2025-04-03 NOTE — PROGRESS NOTES
[facility] GERIATRICS    Chief Complaint   Patient presents with    RECHECK     HPI:  Heydi Barriga is a 80 year old  (1945), who is being seen today for an episodic care visit at: [facility]) [[id number]]. Today's concern is: ***    Allergies, and PMH/PSH reviewed in EPIC today.  REVIEW OF SYSTEMS:  {zqhxjc55:762202}    Objective:   BP (!) 148/97   Pulse 80   Temp 97.6  F (36.4  C)   Resp 16   Wt 90.7 kg (200 lb)   BMI 32.28 kg/m    {Nursing home physical exam :690464}    {fgslab:853447}    Assessment/Plan:  {FGS DX2:202714}    MED REC REQUIRED{TIP  Click the link below to document or use med rec list, use list to pull in response :732642}  Post Medication Reconciliation Status: {MED REC LIST:692400}      Orders:  {fgsorders:793785}  ***    Electronically signed by: Chante Brown ***       tasks. Feels anxious and depressed about her cognitive decline. She was having angry outbursts, and reports this has improved. Has difficulty sleeping at times when feeling agitated and anxious. Reports cough and nasal congestion have improved after recent COVID-19. Has mild shortness of breath with activity, unchanged from baseline. Good appetite. Reports acute neck pain and spasms have resolved, no longer using tizanidine. Denies recent flares of joint pain. Ambulates with a walker. Independent with cares. Meds are administered by staff.       Allergies, and PMH/PSH reviewed in EPIC today    REVIEW OF SYSTEMS:  4 point ROS including Respiratory, CV, GI and , other than that noted in the HPI,  is negative    Objective:   BP (!) 148/97   Pulse 80   Temp 97.6  F (36.4  C)   Resp 16   Wt 90.7 kg (200 lb)   BMI 32.28 kg/m    GENERAL APPEARANCE:  Alert, in no distress  ENT:  [place]   EYES:  conjunctiva and lids normal  RESP:  lungs clear to auscultation   CV:   irregular rhythm, rate normal, no murmur, no LE edema   ABDOMEN:  soft, non-tender, no distension  M/S:   gait steady with walker. Scoliosis. [place] with good strength   SKIN:  no rashes or open areas  PSYCH:  oriented X 3, memory impaired,  affect normal     Recent labs in [facility] reviewed by me today.       ASSESSMENT / PLAN:  (G30.9,  F02.80) Alzheimer's disease (H)  (primary encounter diagnosis)  Comment: progressive disease, poor short term memory   Potential benefits and side effects of donepezil reviewed, she would like to proceed.   Plan: start donepezil 5 mg daily. AL staff assistance with cares, meals, activities, med admin. Referral has been made to [facility] per her request, but she may decide not to schedule.     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 Strattera possibly contributing to  anxiety, anger and agitation. Difficulty concentrating and completing tasks more likely related to dementia than ADHD.   Plan: decrease Strattera to 25 mg daily and monitor symptoms. Continue Pristiq 100 mg HS and Wellbutrin  mg daily. Follow up with Psychiatry.     (I48.20) Chronic a-fib (H)  Comment: not requiring rate control   Followed by [facility]. Multiple failed cardioversions, most recent was in 2018. Sotalol dc'd 2022.   Plan: continue Xarelto 20 mg daily. Follow up with Cardiology as scheduled.     (I10) Primary hypertension  Comment: improved control with /80 today.   Losartan last increased 3/11/2025  Plan: continue losartan 100 mg daily. BMP    (K21.00) Gastroesophageal reflux disease with esophagitis without hemorrhage  Comment: symptoms managed   Plan: discontinue famotidine and start omeprazole for better GI protection     (J44.9) Chronic obstructive pulmonary disease without exacerbation (H)  Comment: COVID symptoms resolved. She requests continue mucinex for congestion   Plan:  continue mucinex 600 mg bid, continue Symbicort, albuterol prn. Pulmonary follow up as scheduled.     (M06.9) Rheumatoid arthritis involving multiple sites, unspecified whether rheumatoid factor present (H)  Comment: pain controlled, no recent flares  Plan: continue baracitinib, Plaquenil, lidocaine patch, tylenol prn. Change diclofenac gel to bid prn.     (M54.2) Neck pain  (M62.838) Muscle spasm  Comment: acute pain and spasms resolved   Plan: discontinue tizanidine. Change diclofenac gel to prn, continue lidocaine patch prn, tylenol prn     History of vertigo-meclizine prn has not been used and will be dc'd.       All meds changes reviewed and per recommendations from Jo Ann Daley PharmD.           Electronically signed by: MYKE Murcia CNP

## 2025-04-04 VITALS — OXYGEN SATURATION: 96 % | SYSTOLIC BLOOD PRESSURE: 128 MMHG | HEART RATE: 72 BPM | DIASTOLIC BLOOD PRESSURE: 80 MMHG

## 2025-04-04 RX ORDER — CINACALCET 60 MG/1
60 TABLET, FILM COATED ORAL 2 TIMES DAILY
COMMUNITY
Start: 2025-03-19

## 2025-04-07 NOTE — PATIENT INSTRUCTIONS
"Recommendations from today's MTM visit:                                                    MTM (medication therapy management) is a service provided by a clinical pharmacist designed to help you get the most of out of your medicines.   Today we reviewed what your medicines are for, how to know if they are working, that your medicines are safe and how to make your medicine regimen as easy as possible.      Continue Mucinex (Guaifenesin ER 600mg) twice daily.    Begin donepezil 5mg at bedtime.    Reduce Strattera to 25mg once daily.    Discontinue famotidine and begin omeprazole 20mg once daily.    Discontinue as needed meclizine and tizanidine.    Provider may consider recheck BMP and lipid panel.    Follow-up: 4-6 weeks      It was great speaking with you today.  I value your experience and would be very thankful for your time in providing feedback in our clinic survey. In the next few days, you may receive an email or text message from [employer] with a link to a survey related to your  clinical pharmacist.\"     To schedule another MTM appointment, please call me directly or you may call the MTM scheduling line at [phone] or toll-free at [phone].     My Clinical Pharmacist's contact information:                                                      Please feel free to contact me with any questions or concerns you have.      Jo Ann Daley, Pharm.D.,[place]  Board Certified Geriatric Pharmacist  Medication Therapy Management Pharmacist  [phone]      "

## 2025-04-09 ENCOUNTER — LAB REQUISITION (OUTPATIENT)
Dept: LAB | Facility: CLINIC | Age: 80
End: 2025-04-09
Payer: MEDICARE

## 2025-04-09 DIAGNOSIS — I25.10 ATHEROSCLEROTIC HEART DISEASE OF NATIVE CORONARY ARTERY WITHOUT ANGINA PECTORIS: ICD-10-CM

## 2025-04-09 DIAGNOSIS — I10 ESSENTIAL (PRIMARY) HYPERTENSION: ICD-10-CM

## 2025-04-10 ENCOUNTER — TRANSFERRED RECORDS (OUTPATIENT)
Dept: HEALTH INFORMATION MANAGEMENT | Facility: CLINIC | Age: 80
End: 2025-04-10
Payer: MEDICARE

## 2025-04-10 ENCOUNTER — LAB REQUISITION (OUTPATIENT)
Dept: LAB | Facility: CLINIC | Age: 80
End: 2025-04-10
Payer: MEDICARE

## 2025-04-10 DIAGNOSIS — I10 ESSENTIAL (PRIMARY) HYPERTENSION: ICD-10-CM

## 2025-04-10 DIAGNOSIS — I25.10 ATHEROSCLEROTIC HEART DISEASE OF NATIVE CORONARY ARTERY WITHOUT ANGINA PECTORIS: ICD-10-CM

## 2025-04-17 LAB
ANION GAP SERPL CALCULATED.3IONS-SCNC: 10 MMOL/L (ref 7–15)
BUN SERPL-MCNC: 19 MG/DL (ref 8–23)
CALCIUM SERPL-MCNC: 9.3 MG/DL (ref 8.8–10.4)
CHLORIDE SERPL-SCNC: 106 MMOL/L (ref 98–107)
CHOLEST SERPL-MCNC: 136 MG/DL
CREAT SERPL-MCNC: 0.65 MG/DL (ref 0.51–0.95)
EGFRCR SERPLBLD CKD-EPI 2021: 87 ML/MIN/1.73M2
FASTING STATUS PATIENT QL REPORTED: NO
GLUCOSE SERPL-MCNC: 99 MG/DL (ref 70–99)
HCO3 SERPL-SCNC: 22 MMOL/L (ref 22–29)
HDLC SERPL-MCNC: 83 MG/DL
LDLC SERPL CALC-MCNC: 49 MG/DL
NONHDLC SERPL-MCNC: 53 MG/DL
POTASSIUM SERPL-SCNC: 4.7 MMOL/L (ref 3.4–5.3)
SODIUM SERPL-SCNC: 138 MMOL/L (ref 135–145)
TRIGL SERPL-MCNC: 22 MG/DL

## 2025-04-17 PROCEDURE — 36415 COLL VENOUS BLD VENIPUNCTURE: CPT | Mod: ORL | Performed by: NURSE PRACTITIONER

## 2025-04-17 PROCEDURE — 80048 BASIC METABOLIC PNL TOTAL CA: CPT | Mod: ORL | Performed by: NURSE PRACTITIONER

## 2025-04-17 PROCEDURE — P9604 ONE-WAY ALLOW PRORATED TRIP: HCPCS | Mod: ORL | Performed by: NURSE PRACTITIONER

## 2025-04-17 PROCEDURE — 80061 LIPID PANEL: CPT | Mod: ORL | Performed by: NURSE PRACTITIONER

## 2025-04-19 VITALS
HEART RATE: 76 BPM | BODY MASS INDEX: 29.86 KG/M2 | RESPIRATION RATE: 18 BRPM | SYSTOLIC BLOOD PRESSURE: 152 MMHG | DIASTOLIC BLOOD PRESSURE: 86 MMHG | WEIGHT: 185 LBS | OXYGEN SATURATION: 98 %

## 2025-05-12 VITALS
BODY MASS INDEX: 32.28 KG/M2 | WEIGHT: 200 LBS | SYSTOLIC BLOOD PRESSURE: 128 MMHG | HEART RATE: 72 BPM | RESPIRATION RATE: 18 BRPM | OXYGEN SATURATION: 96 % | DIASTOLIC BLOOD PRESSURE: 80 MMHG

## 2025-06-17 ENCOUNTER — ASSISTED LIVING VISIT (OUTPATIENT)
Dept: GERIATRICS | Facility: CLINIC | Age: 80
End: 2025-06-17
Payer: MEDICARE

## 2025-06-17 VITALS
HEART RATE: 80 BPM | WEIGHT: 200 LBS | RESPIRATION RATE: 16 BRPM | BODY MASS INDEX: 32.28 KG/M2 | DIASTOLIC BLOOD PRESSURE: 97 MMHG | SYSTOLIC BLOOD PRESSURE: 148 MMHG | TEMPERATURE: 97.6 F

## 2025-06-17 DIAGNOSIS — G30.9 ALZHEIMER'S DISEASE (H): Primary | ICD-10-CM

## 2025-06-17 DIAGNOSIS — F02.80 ALZHEIMER'S DISEASE (H): Primary | ICD-10-CM

## 2025-06-17 RX ORDER — DONEPEZIL HYDROCHLORIDE 10 MG/1
10 TABLET, FILM COATED ORAL AT BEDTIME
Qty: 30 TABLET | Refills: 11 | Status: SHIPPED | OUTPATIENT
Start: 2025-06-17

## 2025-06-17 NOTE — LETTER
6/17/2025      Heydi Barriga  [address]  [place] [address]        No notes on file      Sincerely,        MYKE Wolfe CNP    Electronically signed

## 2025-06-17 NOTE — PROGRESS NOTES
[facility] GERIATRICS    Chief Complaint   Patient presents with    RECHECK     HPI:  Heydi Barriga is a 80 year old  (1945), who is being seen today for an episodic care visit at: [facility]) [[id number]]. Today's concern is: ***    Allergies, and PMH/PSH reviewed in EPIC today.  REVIEW OF SYSTEMS:  {fgfgki48:902555}    Objective:   BP (!) 148/97   Pulse 80   Temp 97.6  F (36.4  C)   Resp 16   Wt 90.7 kg (200 lb)   BMI 32.28 kg/m    {Nursing home physical exam :797845}    {fgslab:811280}    Assessment/Plan:  {FGS DX2:996115}    MED REC REQUIRED{TIP  Click the link below to document or use med rec list, use list to pull in response :997952}  Post Medication Reconciliation Status: {MED REC LIST:741145}      Orders:  {fgsorders:050213}  ***    Electronically signed by: Chante Brown ***       Repair Performed By Another Provider Text (Leave Blank If You Do Not Want): After obtaining clear surgical margins the defect was repaired by another provider.

## 2025-06-24 ENCOUNTER — TELEPHONE (OUTPATIENT)
Dept: GERIATRICS | Facility: CLINIC | Age: 80
End: 2025-06-24
Payer: MEDICARE

## 2025-06-24 DIAGNOSIS — R19.7 DIARRHEA, UNSPECIFIED TYPE: ICD-10-CM

## 2025-06-24 DIAGNOSIS — F02.80 ALZHEIMER'S DISEASE (H): Primary | ICD-10-CM

## 2025-06-24 DIAGNOSIS — G30.9 ALZHEIMER'S DISEASE (H): Primary | ICD-10-CM

## 2025-06-24 RX ORDER — DONEPEZIL HYDROCHLORIDE 5 MG/1
5 TABLET, FILM COATED ORAL AT BEDTIME
Qty: 30 TABLET | Refills: 11 | Status: SHIPPED | OUTPATIENT
Start: 2025-06-24

## 2025-06-24 RX ORDER — LOPERAMIDE HYDROCHLORIDE 2 MG/1
2 TABLET ORAL DAILY PRN
Qty: 30 TABLET | Refills: 3 | Status: SHIPPED | OUTPATIENT
Start: 2025-06-24

## 2025-06-24 NOTE — TELEPHONE ENCOUNTER
AL nurse reports patient has had diarrhea for the past few days. No nausea or vomiting. Patient also reported a sore throat yesterday. COVID test was negative. Afebrile.   Patient is out of the building today. Spoke with [facility] Amalia, who reports diarrhea has been intermittent for several days. A friend brought in a few doses of imodium for her to use over the weekend.   Discussed the diarrhea is likely due to the increase in donepezil on 6/18/2025. Will decrease back to 5 mg daily, which she tolerated well.   Amalia will call if GI symptoms continue or worsen.     Orders written at AL and sent to pharmacy:  Decrease donepezil to 5 mg HS  Imodium 2 mg daily prn      MYKE Wolfe CNP on 6/24/2025 at 1:09 PM

## 2025-07-24 ENCOUNTER — OFFICE VISIT (OUTPATIENT)
Dept: PHARMACY | Facility: CLINIC | Age: 80
End: 2025-07-24
Payer: COMMERCIAL

## 2025-07-24 VITALS — DIASTOLIC BLOOD PRESSURE: 76 MMHG | SYSTOLIC BLOOD PRESSURE: 132 MMHG

## 2025-07-24 DIAGNOSIS — R42 DIZZINESS: ICD-10-CM

## 2025-07-24 DIAGNOSIS — F02.80 ALZHEIMER'S DISEASE (H): ICD-10-CM

## 2025-07-24 DIAGNOSIS — I25.10 CORONARY ARTERY DISEASE INVOLVING NATIVE CORONARY ARTERY OF NATIVE HEART WITHOUT ANGINA PECTORIS: ICD-10-CM

## 2025-07-24 DIAGNOSIS — F90.9 ATTENTION DEFICIT HYPERACTIVITY DISORDER (ADHD), UNSPECIFIED ADHD TYPE: ICD-10-CM

## 2025-07-24 DIAGNOSIS — E78.5 HYPERLIPIDEMIA LDL GOAL <70: ICD-10-CM

## 2025-07-24 DIAGNOSIS — K21.9 GASTROESOPHAGEAL REFLUX DISEASE, UNSPECIFIED WHETHER ESOPHAGITIS PRESENT: ICD-10-CM

## 2025-07-24 DIAGNOSIS — G30.9 ALZHEIMER'S DISEASE (H): ICD-10-CM

## 2025-07-24 DIAGNOSIS — I10 PRIMARY HYPERTENSION: ICD-10-CM

## 2025-07-24 DIAGNOSIS — F33.0 MAJOR DEPRESSIVE DISORDER, RECURRENT EPISODE, MILD: Primary | ICD-10-CM

## 2025-07-24 DIAGNOSIS — R19.7 DIARRHEA, UNSPECIFIED TYPE: ICD-10-CM

## 2025-07-24 DIAGNOSIS — I48.0 PAROXYSMAL ATRIAL FIBRILLATION (H): ICD-10-CM

## 2025-07-24 NOTE — Clinical Note
Thanks Pablo!  I had a thought as I was documenting.  Do you think it makes sense to recheck a CBC since she eluded to potential blood in her stool on occasion and since she is on the Xarelto? Thanks again!!

## 2025-07-24 NOTE — PATIENT INSTRUCTIONS
"Recommendations from today's MTM visit:                                                       Reduce bupropion XL to 150mg daily in the morning.    Please use Cpap regularly.    Provider may consider recheck CBC.      Follow-up: 6 weeks, sooner if necessary    It was great speaking with you today.  I value your experience and would be very thankful for your time in providing feedback in our clinic survey. In the next few days, you may receive an email or text message from [employer] with a link to a survey related to your  clinical pharmacist.\"     To schedule another MTM appointment, please call me directly or you may call the MTM scheduling line at [phone] or toll-free at [phone].     My Clinical Pharmacist's contact information:                                                      Please feel free to contact me with any questions or concerns you have.      Jo Ann Daley, Pharm.D.,[facility]  Board Certified Geriatric Pharmacist  Medication Therapy Management Pharmacist  [phone]      "

## 2025-07-24 NOTE — PROGRESS NOTES
Medication Therapy Management (MTM) Encounter    ASSESSMENT:                            Medication Adherence/Access: No issues identified.    Hypertension, CAD, Afib   Blood pressure is at goal <130-140/90mmHg.  Last CBC on 2/6/25 and Hgb normal.  Due to potential concern of blood in stool (although this is unclear), could consider recheck CBC.  Of note, Xarelto is on [employer] for long-term treatment of nonvalvular afib or VTE due to it appears to have higher risk of major and GI bleeding in older adults vs apixaban.  If necessary, could consider switch to apixaban in future.     Hyperlipidemia   Stable. LDL at goal <70.    GERD  Stable.  Appropriate to continue PPI for GI protection due to patient is on Xarelto and has history of PUD, placing her at greater risk of GI bleed.  NP also considering checking into colonoscopy per patient request.    Alzheimers Disease   Depression, ADHD  Seems discontinuation of Strattera has helped to improve agitation/anger, anxiety.  Does continue with some anxiety and noting some difficulty sleeping (although this isn't new).  Would benefit from regular use of Cpap for sleep, feeling rested, cognition, etc.  May also benefit from reduction in bupropion XL dose due to potential for this medication to contribute to anxiety/agitation; tends to be stimulating.  Patient would like to try these changes.    Dizziness  Stable.    Diarrhea  Stable.    PLAN:                            Reduce bupropion XL to 150mg daily in the morning.    Please use Cpap regularly.    Provider may consider recheck CBC.      Follow-up: 6 weeks, sooner if necessary    SUBJECTIVE/OBJECTIVE:                          Heydi Barriga is a 80 year old female seen for a follow-up visit. Today's visit is a co-visit with Pablo Prabhakar NP.      Reason for visit: follow-up MTM.    Allergies/ADRs: Reviewed in chart  Past Medical History: Reviewed in chart  Tobacco: She reports that she has quit smoking. She has never used  smokeless tobacco.  Alcohol: not currently using  Assistive Devices: walker  Recent Falls: no  Specialists: [facility], [facility], [facility], Endocrine, [facility], psychiatry    Medication Adherence/Access: Patient has assistance with medication administration: assisted living.    Hypertension , CAD, Afib  Losartan 100mg daily  Xarelto 20mg daily    Follows with cardiology. Multiple failed cardioversions per chart review.  Losartan was increased in 3/2025.  No edema.  Occasional dizziness - see below.    No signs/symptoms of bleeding, although she states possibly with occasional blood in stool, but this in unclear.    BP Readings from Last 3 Encounters:   07/24/25 132/76   07/24/25 137/87   06/17/25 126/72          Hyperlipidemia   rosuvastatin 10mg daily    Patient reports no significant myalgias or other side effects.         GERD , Nausea  Omeprazole 20mg daily  Ondansetron ODT 4mg three times daily as needed    On 4/3/25, famotidine 20mg twice daily stopped and omeprazole initiated.  No heartburn, but occasional nausea feeling that she states seems to correlate with eating salads/greens .  Has not been using as needed ondansetron.  Wonders about having a colonoscopy - history of father with colon cancer and as noted above, she is stating possible occasional blood in stool.  She does have history of PUD.  Reports ulcer several years ago.  Denies side effects.         Mental Health   Alzheimers Disease   Depression, ADHD  Bupropion XL 300mg daily (noon)  Pristiq 100mg at bedtime    Of note, trialed Leqembi which began in 9/2024; had approx 11 doses per her care coordinator.  Leqembi stopped due to patient feeling it was ineffective.  Donepezil 5mg daily initiated 4/3/25 & Strattera reduced to 25mg daily from 40mg daily at that time as well (has noted a goal to strengthen her cognition and slow progression of dementia), concern that Strattera was contributing to  "anxiety/agitation.  On 6/17/25, donepezil increased to 10mg daily & Strattera discontinued.  On chart review, note an order for as needed ondansetron sent due to nausea/vomiting & patient also had diarrhea, so donepezil reduced back to 5mg daily on 6/24.    At 6/17 visit she reported a decline in her \"executive abilities\" and stated she was having more trouble using her computer and phone.  [employer]  Amalia notes that Heydi seems to be doing much better.  Continues to decline when it comes to technology, using the phone/e-mail, not easy for her to manage her schedule but states her mood has improved.  Anxiety/agitation have improved, although she reports she does get anxious.  Heydi states \"Isolating too much perhaps.\"  Likes to be alone, but knows not always the best - this is not new and states she has always been this way.  Overall feeling \"really good\".    She is doing brain games.    Not using Cpap as often (for about 3 months) - had trouble with it.  Always has gone to bed later, gets up later.  Will fall asleep on couch at times.  Doesn't wake up feeling rested.    Patient reports the following potential medication side effects: anxiety, sleep difficulty.           Dizziness  No current medications.    History of vertigo diagnosis - crystals in ear.   As needed meclizine stopped on 4/3/25.  States may have some dizziness \"if moves too fast, makes a turn\".     Diarrhea  Loperamide 2mg daily as needed    No longer with diarrhea as noted above.  Hasn't been needing as needed loperamide.  States salads/greens will occasionally cause symptoms, but no current concerns.    Est CrCl ([place]) = 73ml/min (based on Scr 0.65 & adj BW 69.1kg)    Today's Vitals: /76 (Patient Position: Sitting)   ----------------      I spent 38 minutes with this patient today.     A summary of these recommendations was declined by the patient.  Amalia requested update via secure text, which I " completed.    Jo Ann Daley, Pharm.D.,[facility] [place]  Board Certified Geriatric Pharmacist  Medication Therapy Management Pharmacist  [phone]           Medication Therapy Recommendations  Major depressive disorder, recurrent episode, mild   1 Current Medication: buPROPion (WELLBUTRIN XL) 300 MG 24 hr tablet (Discontinued)   Current Medication Sig: Take 300 mg by mouth daily (at 12:00)   Rationale: Undesirable effect - Adverse medication event - Safety   Recommendation: Decrease Dose   Status: Accepted per Provider   Identified Date: 7/24/2025 Completed Date: 7/24/2025         Paroxysmal atrial fibrillation (H)   1 Current Medication: XARELTO ANTICOAGULANT 20 MG TABS tablet   Current Medication Sig: TAKE 1 TABLET BY MOUTH ONCE DAILY   Rationale: Medication requires monitoring - Needs additional monitoring   Recommendation: Order Lab   Status: Contact Provider - Awaiting Response   Identified Date: 7/24/2025

## 2025-07-29 ENCOUNTER — LAB REQUISITION (OUTPATIENT)
Dept: LAB | Facility: CLINIC | Age: 80
End: 2025-07-29
Payer: MEDICARE

## 2025-07-29 DIAGNOSIS — Z83.3 FAMILY HISTORY OF DIABETES MELLITUS: ICD-10-CM

## 2025-07-31 LAB
ANION GAP SERPL CALCULATED.3IONS-SCNC: 10 MMOL/L (ref 7–15)
BASOPHILS # BLD AUTO: 0 10E3/UL (ref 0–0.2)
BASOPHILS NFR BLD AUTO: 0 %
BUN SERPL-MCNC: 13.7 MG/DL (ref 8–23)
CALCIUM SERPL-MCNC: 9.5 MG/DL (ref 8.8–10.4)
CHLORIDE SERPL-SCNC: 105 MMOL/L (ref 98–107)
CREAT SERPL-MCNC: 0.74 MG/DL (ref 0.51–0.95)
EGFRCR SERPLBLD CKD-EPI 2021: 80 ML/MIN/1.73M2
EOSINOPHIL # BLD AUTO: 0.1 10E3/UL (ref 0–0.7)
EOSINOPHIL NFR BLD AUTO: 1 %
ERYTHROCYTE [DISTWIDTH] IN BLOOD BY AUTOMATED COUNT: 16.2 % (ref 10–15)
EST. AVERAGE GLUCOSE BLD GHB EST-MCNC: 123 MG/DL
GLUCOSE SERPL-MCNC: 97 MG/DL (ref 70–99)
HBA1C MFR BLD: 5.9 %
HCO3 SERPL-SCNC: 25 MMOL/L (ref 22–29)
HCT VFR BLD AUTO: 32.7 % (ref 35–47)
HGB BLD-MCNC: 10.3 G/DL (ref 11.7–15.7)
IMM GRANULOCYTES # BLD: 0 10E3/UL
IMM GRANULOCYTES NFR BLD: 0 %
LYMPHOCYTES # BLD AUTO: 1.6 10E3/UL (ref 0.8–5.3)
LYMPHOCYTES NFR BLD AUTO: 43 %
MCH RBC QN AUTO: 29.9 PG (ref 26.5–33)
MCHC RBC AUTO-ENTMCNC: 31.5 G/DL (ref 31.5–36.5)
MCV RBC AUTO: 95 FL (ref 78–100)
MONOCYTES # BLD AUTO: 0.4 10E3/UL (ref 0–1.3)
MONOCYTES NFR BLD AUTO: 11 %
NEUTROPHILS # BLD AUTO: 1.6 10E3/UL (ref 1.6–8.3)
NEUTROPHILS NFR BLD AUTO: 44 %
NRBC # BLD AUTO: 0 10E3/UL
NRBC BLD AUTO-RTO: 0 /100
PLATELET # BLD AUTO: 247 10E3/UL (ref 150–450)
POTASSIUM SERPL-SCNC: 4.4 MMOL/L (ref 3.4–5.3)
RBC # BLD AUTO: 3.44 10E6/UL (ref 3.8–5.2)
SODIUM SERPL-SCNC: 140 MMOL/L (ref 135–145)
TSH SERPL DL<=0.005 MIU/L-ACNC: 1.21 UIU/ML (ref 0.3–4.2)
WBC # BLD AUTO: 3.7 10E3/UL (ref 4–11)

## 2025-07-31 PROCEDURE — 36415 COLL VENOUS BLD VENIPUNCTURE: CPT | Mod: ORL | Performed by: NURSE PRACTITIONER

## 2025-07-31 PROCEDURE — P9604 ONE-WAY ALLOW PRORATED TRIP: HCPCS | Mod: ORL | Performed by: NURSE PRACTITIONER

## 2025-07-31 PROCEDURE — 84443 ASSAY THYROID STIM HORMONE: CPT | Mod: ORL | Performed by: NURSE PRACTITIONER

## 2025-07-31 PROCEDURE — 85025 COMPLETE CBC W/AUTO DIFF WBC: CPT | Mod: ORL | Performed by: NURSE PRACTITIONER

## 2025-07-31 PROCEDURE — 83036 HEMOGLOBIN GLYCOSYLATED A1C: CPT | Mod: ORL | Performed by: NURSE PRACTITIONER

## 2025-07-31 PROCEDURE — 80048 BASIC METABOLIC PNL TOTAL CA: CPT | Mod: ORL | Performed by: NURSE PRACTITIONER

## 2025-08-05 ENCOUNTER — LAB REQUISITION (OUTPATIENT)
Dept: LAB | Facility: CLINIC | Age: 80
End: 2025-08-05
Payer: MEDICARE

## 2025-08-05 DIAGNOSIS — D50.0 IRON DEFICIENCY ANEMIA SECONDARY TO BLOOD LOSS (CHRONIC): ICD-10-CM

## 2025-08-07 LAB
FERRITIN SERPL-MCNC: 32 NG/ML (ref 11–328)
FOLATE SERPL-MCNC: 11 NG/ML (ref 4.6–34.8)
IRON BINDING CAPACITY (ROCHE): 294 UG/DL (ref 240–430)
IRON SATN MFR SERPL: 17 % (ref 15–46)
IRON SERPL-MCNC: 49 UG/DL (ref 37–145)
IRON SERPL-MCNC: 49 UG/DL (ref 37–145)
TRANSFERRIN SERPL-MCNC: 253 MG/DL (ref 200–360)
VIT B12 SERPL-MCNC: 371 PG/ML (ref 232–1245)

## 2025-08-07 PROCEDURE — 36415 COLL VENOUS BLD VENIPUNCTURE: CPT | Mod: ORL | Performed by: NURSE PRACTITIONER

## 2025-08-07 PROCEDURE — 82607 VITAMIN B-12: CPT | Mod: ORL | Performed by: NURSE PRACTITIONER

## 2025-08-07 PROCEDURE — 83540 ASSAY OF IRON: CPT | Mod: ORL | Performed by: NURSE PRACTITIONER

## 2025-08-07 PROCEDURE — 82728 ASSAY OF FERRITIN: CPT | Mod: ORL | Performed by: NURSE PRACTITIONER

## 2025-08-07 PROCEDURE — 82746 ASSAY OF FOLIC ACID SERUM: CPT | Mod: ORL | Performed by: NURSE PRACTITIONER

## 2025-08-07 PROCEDURE — 84466 ASSAY OF TRANSFERRIN: CPT | Mod: ORL | Performed by: NURSE PRACTITIONER

## 2025-08-07 PROCEDURE — P9604 ONE-WAY ALLOW PRORATED TRIP: HCPCS | Mod: ORL | Performed by: NURSE PRACTITIONER

## 2025-08-07 PROCEDURE — 83550 IRON BINDING TEST: CPT | Mod: ORL | Performed by: NURSE PRACTITIONER

## (undated) DEVICE — BLADE SAW OSCIL/SAG STRK MICRO 5.5X25X0.38MM 2296-003-524

## (undated) DEVICE — SU ETHILON 3-0 FS-1 18" 669H

## (undated) DEVICE — SOL WATER IRRIG 1000ML BOTTLE 2F7114

## (undated) DEVICE — GLOVE PROTEXIS MICRO 8.5  2D73PM85

## (undated) DEVICE — DRILL BIT MINI 85MM QC 1.5MM  310.15

## (undated) DEVICE — SU VICRYL 1 CT-1 27" UND J261H

## (undated) DEVICE — PREP DURAPREP 26ML APL 8630

## (undated) DEVICE — GLOVE PROTEXIS W/NEU-THERA 8.5  2D73TE85

## (undated) DEVICE — GLOVE PROTEXIS W/NEU-THERA 8.0  2D73TE80

## (undated) DEVICE — LINEN TOWEL PACK X5 5464

## (undated) DEVICE — PACK EXTREMITY SOP15EXFSD

## (undated) DEVICE — BLADE SAW OSCILLATING STRYK MED 9.0X25X0.38MM 2296-003-111

## (undated) DEVICE — BNDG ELASTIC 4"X5YDS UNSTERILE 6611-40

## (undated) DEVICE — IMM LIMB ELEVATOR DC40-0203

## (undated) DEVICE — DRSG ABDOMINAL 07 1/2X8" 7197D

## (undated) DEVICE — DRILL BIT MINI 60MM QC 1.1MM  310.11

## (undated) DEVICE — GOWN XXLG REINFORCED 9071EL

## (undated) DEVICE — SOL NACL 0.9% IRRIG 1000ML BOTTLE 2F7124

## (undated) DEVICE — GLOVE PROTEXIS W/NEU-THERA 7.5  2D73TE75

## (undated) DEVICE — CAST PADDING 4" STERILE 9044S

## (undated) RX ORDER — HYDROCODONE BITARTRATE AND ACETAMINOPHEN 5; 325 MG/1; MG/1
TABLET ORAL
Status: DISPENSED
Start: 2020-01-20

## (undated) RX ORDER — FENTANYL CITRATE 0.05 MG/ML
INJECTION, SOLUTION INTRAMUSCULAR; INTRAVENOUS
Status: DISPENSED
Start: 2020-01-20

## (undated) RX ORDER — HYDROMORPHONE HYDROCHLORIDE 1 MG/ML
INJECTION, SOLUTION INTRAMUSCULAR; INTRAVENOUS; SUBCUTANEOUS
Status: DISPENSED
Start: 2020-01-20

## (undated) RX ORDER — LIDOCAINE HYDROCHLORIDE 20 MG/ML
INJECTION, SOLUTION EPIDURAL; INFILTRATION; INTRACAUDAL; PERINEURAL
Status: DISPENSED
Start: 2020-01-20

## (undated) RX ORDER — DEXAMETHASONE SODIUM PHOSPHATE 4 MG/ML
INJECTION, SOLUTION INTRA-ARTICULAR; INTRALESIONAL; INTRAMUSCULAR; INTRAVENOUS; SOFT TISSUE
Status: DISPENSED
Start: 2020-01-20

## (undated) RX ORDER — CEFAZOLIN SODIUM 2 G/100ML
INJECTION, SOLUTION INTRAVENOUS
Status: DISPENSED
Start: 2020-01-20

## (undated) RX ORDER — FENTANYL CITRATE 50 UG/ML
INJECTION, SOLUTION INTRAMUSCULAR; INTRAVENOUS
Status: DISPENSED
Start: 2020-01-20

## (undated) RX ORDER — ONDANSETRON 2 MG/ML
INJECTION INTRAMUSCULAR; INTRAVENOUS
Status: DISPENSED
Start: 2020-01-20